# Patient Record
Sex: MALE | Race: WHITE | Employment: OTHER | URBAN - METROPOLITAN AREA
[De-identification: names, ages, dates, MRNs, and addresses within clinical notes are randomized per-mention and may not be internally consistent; named-entity substitution may affect disease eponyms.]

---

## 2017-02-24 ENCOUNTER — OFFICE VISIT (OUTPATIENT)
Dept: ORTHOPEDIC SURGERY | Age: 72
End: 2017-02-24

## 2017-02-24 VITALS
SYSTOLIC BLOOD PRESSURE: 181 MMHG | WEIGHT: 164.9 LBS | DIASTOLIC BLOOD PRESSURE: 102 MMHG | HEIGHT: 73 IN | HEART RATE: 84 BPM | BODY MASS INDEX: 21.86 KG/M2

## 2017-02-24 DIAGNOSIS — M25.561 RIGHT KNEE PAIN, UNSPECIFIED CHRONICITY: Primary | ICD-10-CM

## 2017-02-24 DIAGNOSIS — M23.303 MENISCUS, MEDIAL, DERANGEMENT, RIGHT: ICD-10-CM

## 2017-02-24 PROCEDURE — G8419 CALC BMI OUT NRM PARAM NOF/U: HCPCS | Performed by: ORTHOPAEDIC SURGERY

## 2017-02-24 PROCEDURE — 4040F PNEUMOC VAC/ADMIN/RCVD: CPT | Performed by: ORTHOPAEDIC SURGERY

## 2017-02-24 PROCEDURE — 1123F ACP DISCUSS/DSCN MKR DOCD: CPT | Performed by: ORTHOPAEDIC SURGERY

## 2017-02-24 PROCEDURE — 73562 X-RAY EXAM OF KNEE 3: CPT | Performed by: ORTHOPAEDIC SURGERY

## 2017-02-24 PROCEDURE — 20610 DRAIN/INJ JOINT/BURSA W/O US: CPT | Performed by: ORTHOPAEDIC SURGERY

## 2017-02-24 PROCEDURE — 99213 OFFICE O/P EST LOW 20 MIN: CPT | Performed by: ORTHOPAEDIC SURGERY

## 2017-02-24 PROCEDURE — G8427 DOCREV CUR MEDS BY ELIG CLIN: HCPCS | Performed by: ORTHOPAEDIC SURGERY

## 2017-02-24 PROCEDURE — 1036F TOBACCO NON-USER: CPT | Performed by: ORTHOPAEDIC SURGERY

## 2017-02-24 PROCEDURE — G8484 FLU IMMUNIZE NO ADMIN: HCPCS | Performed by: ORTHOPAEDIC SURGERY

## 2017-02-24 PROCEDURE — 3017F COLORECTAL CA SCREEN DOC REV: CPT | Performed by: ORTHOPAEDIC SURGERY

## 2017-02-24 RX ORDER — LISINOPRIL AND HYDROCHLOROTHIAZIDE 20; 12.5 MG/1; MG/1
2 TABLET ORAL DAILY
COMMUNITY
Start: 2016-12-19

## 2017-03-13 ENCOUNTER — TELEPHONE (OUTPATIENT)
Dept: ORTHOPEDIC SURGERY | Age: 72
End: 2017-03-13

## 2017-03-28 ENCOUNTER — HOSPITAL ENCOUNTER (OUTPATIENT)
Dept: SURGERY | Age: 72
Discharge: HOME | End: 2017-03-28
Attending: ORTHOPAEDIC SURGERY | Admitting: ORTHOPAEDIC SURGERY

## 2017-03-28 VITALS
RESPIRATION RATE: 15 BRPM | DIASTOLIC BLOOD PRESSURE: 78 MMHG | OXYGEN SATURATION: 99 % | WEIGHT: 170.4 LBS | TEMPERATURE: 98 F | HEART RATE: 78 BPM | HEIGHT: 74 IN | SYSTOLIC BLOOD PRESSURE: 147 MMHG | BODY MASS INDEX: 21.87 KG/M2

## 2017-03-28 LAB
HCT VFR BLD CALC: 41.9 % (ref 40.5–52.5)
HEMOGLOBIN: 14.2 G/DL (ref 13.5–17.5)
MCH RBC QN AUTO: 30.7 PG (ref 26–34)
MCHC RBC AUTO-ENTMCNC: 34 G/DL (ref 31–36)
MCV RBC AUTO: 90.4 FL (ref 80–100)
PDW BLD-RTO: 12.8 % (ref 12.4–15.4)
PLATELET # BLD: 155 K/UL (ref 135–450)
PMV BLD AUTO: 9.1 FL (ref 5–10.5)
RBC # BLD: 4.64 M/UL (ref 4.2–5.9)
REASON FOR REJECTION: NORMAL
REJECTED TEST: NORMAL
WBC # BLD: 4.7 K/UL (ref 4–11)

## 2017-03-28 RX ORDER — HYDROMORPHONE HCL 110MG/55ML
0.25 PATIENT CONTROLLED ANALGESIA SYRINGE INTRAVENOUS
Status: DISCONTINUED | OUTPATIENT
Start: 2017-03-28 | End: 2017-03-29 | Stop reason: HOSPADM

## 2017-03-28 RX ORDER — CEFAZOLIN SODIUM 2 G/100ML
2 INJECTION, SOLUTION INTRAVENOUS
Status: COMPLETED | OUTPATIENT
Start: 2017-03-28 | End: 2017-03-28

## 2017-03-28 RX ORDER — SODIUM CHLORIDE 9 MG/ML
INJECTION, SOLUTION INTRAVENOUS CONTINUOUS
Status: DISCONTINUED | OUTPATIENT
Start: 2017-03-28 | End: 2017-03-29 | Stop reason: HOSPADM

## 2017-03-28 RX ORDER — METOCLOPRAMIDE HYDROCHLORIDE 5 MG/ML
10 INJECTION INTRAMUSCULAR; INTRAVENOUS
Status: ACTIVE | OUTPATIENT
Start: 2017-03-28 | End: 2017-03-28

## 2017-03-28 RX ORDER — LIDOCAINE HYDROCHLORIDE 10 MG/ML
0.5 INJECTION, SOLUTION EPIDURAL; INFILTRATION; INTRACAUDAL; PERINEURAL ONCE
Status: DISCONTINUED | OUTPATIENT
Start: 2017-03-28 | End: 2017-03-29 | Stop reason: HOSPADM

## 2017-03-28 RX ORDER — FENTANYL CITRATE 50 UG/ML
25 INJECTION, SOLUTION INTRAMUSCULAR; INTRAVENOUS EVERY 5 MIN PRN
Status: DISCONTINUED | OUTPATIENT
Start: 2017-03-28 | End: 2017-03-29 | Stop reason: HOSPADM

## 2017-03-28 RX ORDER — ENALAPRILAT 2.5 MG/2ML
1.25 INJECTION INTRAVENOUS
Status: ACTIVE | OUTPATIENT
Start: 2017-03-28 | End: 2017-03-28

## 2017-03-28 RX ORDER — HYDROMORPHONE HCL 110MG/55ML
0.5 PATIENT CONTROLLED ANALGESIA SYRINGE INTRAVENOUS
Status: DISCONTINUED | OUTPATIENT
Start: 2017-03-28 | End: 2017-03-29 | Stop reason: HOSPADM

## 2017-03-28 RX ORDER — ASPIRIN 325 MG
325 TABLET, DELAYED RELEASE (ENTERIC COATED) ORAL DAILY
Qty: 28 TABLET | Refills: 0 | COMMUNITY
Start: 2017-03-28 | End: 2017-05-31

## 2017-03-28 RX ORDER — MEPERIDINE HYDROCHLORIDE 25 MG/ML
12.5 INJECTION INTRAMUSCULAR; INTRAVENOUS; SUBCUTANEOUS EVERY 5 MIN PRN
Status: DISCONTINUED | OUTPATIENT
Start: 2017-03-28 | End: 2017-03-29 | Stop reason: HOSPADM

## 2017-03-28 RX ORDER — PROMETHAZINE HYDROCHLORIDE 25 MG/ML
6.25 INJECTION, SOLUTION INTRAMUSCULAR; INTRAVENOUS
Status: ACTIVE | OUTPATIENT
Start: 2017-03-28 | End: 2017-03-28

## 2017-03-28 RX ORDER — HYDROCODONE BITARTRATE AND ACETAMINOPHEN 5; 325 MG/1; MG/1
1 TABLET ORAL
Status: COMPLETED | OUTPATIENT
Start: 2017-03-28 | End: 2017-03-28

## 2017-03-28 RX ORDER — FENTANYL CITRATE 50 UG/ML
50 INJECTION, SOLUTION INTRAMUSCULAR; INTRAVENOUS EVERY 5 MIN PRN
Status: DISCONTINUED | OUTPATIENT
Start: 2017-03-28 | End: 2017-03-29 | Stop reason: HOSPADM

## 2017-03-28 RX ADMIN — SODIUM CHLORIDE: 9 INJECTION, SOLUTION INTRAVENOUS at 12:32

## 2017-03-28 RX ADMIN — CEFAZOLIN SODIUM 2 G: 2 INJECTION, SOLUTION INTRAVENOUS at 13:31

## 2017-03-28 RX ADMIN — HYDROCODONE BITARTRATE AND ACETAMINOPHEN 1 TABLET: 5; 325 TABLET ORAL at 15:04

## 2017-03-28 ASSESSMENT — PAIN SCALES - GENERAL: PAINLEVEL_OUTOF10: 3

## 2017-03-28 ASSESSMENT — PAIN DESCRIPTION - DESCRIPTORS: DESCRIPTORS: ACHING

## 2017-03-28 ASSESSMENT — PAIN DESCRIPTION - PAIN TYPE: TYPE: SURGICAL PAIN

## 2017-03-28 ASSESSMENT — PAIN - FUNCTIONAL ASSESSMENT: PAIN_FUNCTIONAL_ASSESSMENT: 0-10

## 2017-03-29 ENCOUNTER — HOSPITAL ENCOUNTER (OUTPATIENT)
Dept: PHYSICAL THERAPY | Age: 72
Discharge: HOME | End: 2017-03-31
Admitting: ORTHOPAEDIC SURGERY

## 2017-03-30 ENCOUNTER — TELEPHONE (OUTPATIENT)
Dept: PHYSICAL THERAPY | Age: 72
End: 2017-03-30

## 2017-04-05 ENCOUNTER — HOSPITAL ENCOUNTER (OUTPATIENT)
Dept: PHYSICAL THERAPY | Age: 72
Discharge: HOME | End: 2017-04-05
Admitting: ORTHOPAEDIC SURGERY

## 2017-04-12 ENCOUNTER — HOSPITAL ENCOUNTER (OUTPATIENT)
Dept: PHYSICAL THERAPY | Age: 72
Discharge: HOME | End: 2017-04-12
Admitting: ORTHOPAEDIC SURGERY

## 2017-04-19 ENCOUNTER — HOSPITAL ENCOUNTER (OUTPATIENT)
Dept: PHYSICAL THERAPY | Age: 72
Discharge: HOME | End: 2017-04-19
Admitting: ORTHOPAEDIC SURGERY

## 2017-04-26 ENCOUNTER — HOSPITAL ENCOUNTER (OUTPATIENT)
Dept: PHYSICAL THERAPY | Age: 72
Discharge: HOME | End: 2017-04-26
Admitting: ORTHOPAEDIC SURGERY

## 2017-05-03 ENCOUNTER — HOSPITAL ENCOUNTER (OUTPATIENT)
Dept: PHYSICAL THERAPY | Age: 72
Discharge: HOME | End: 2017-05-03
Admitting: ORTHOPAEDIC SURGERY

## 2017-05-10 ENCOUNTER — HOSPITAL ENCOUNTER (OUTPATIENT)
Dept: PHYSICAL THERAPY | Age: 72
Discharge: HOME | End: 2017-05-10
Admitting: ORTHOPAEDIC SURGERY

## 2017-05-17 ENCOUNTER — HOSPITAL ENCOUNTER (OUTPATIENT)
Dept: PHYSICAL THERAPY | Age: 72
Discharge: HOME | End: 2017-05-17
Admitting: ORTHOPAEDIC SURGERY

## 2017-05-24 ENCOUNTER — HOSPITAL ENCOUNTER (OUTPATIENT)
Dept: PHYSICAL THERAPY | Age: 72
Discharge: HOME | End: 2017-05-24
Admitting: ORTHOPAEDIC SURGERY

## 2017-05-31 ENCOUNTER — HOSPITAL ENCOUNTER (OUTPATIENT)
Dept: PHYSICAL THERAPY | Age: 72
Discharge: HOME | End: 2017-05-31
Admitting: ORTHOPAEDIC SURGERY

## 2019-08-26 ENCOUNTER — OFFICE VISIT (OUTPATIENT)
Dept: ORTHOPEDIC SURGERY | Age: 74
End: 2019-08-26
Payer: MEDICARE

## 2019-08-26 VITALS
HEIGHT: 74 IN | DIASTOLIC BLOOD PRESSURE: 101 MMHG | WEIGHT: 162 LBS | BODY MASS INDEX: 20.79 KG/M2 | HEART RATE: 76 BPM | SYSTOLIC BLOOD PRESSURE: 190 MMHG

## 2019-08-26 DIAGNOSIS — M25.551 PAIN OF RIGHT HIP JOINT: ICD-10-CM

## 2019-08-26 DIAGNOSIS — M70.61 TROCHANTERIC BURSITIS OF RIGHT HIP: Primary | ICD-10-CM

## 2019-08-26 PROCEDURE — 99213 OFFICE O/P EST LOW 20 MIN: CPT | Performed by: ORTHOPAEDIC SURGERY

## 2019-08-26 PROCEDURE — 3017F COLORECTAL CA SCREEN DOC REV: CPT | Performed by: ORTHOPAEDIC SURGERY

## 2019-08-26 PROCEDURE — 4040F PNEUMOC VAC/ADMIN/RCVD: CPT | Performed by: ORTHOPAEDIC SURGERY

## 2019-08-26 PROCEDURE — G8427 DOCREV CUR MEDS BY ELIG CLIN: HCPCS | Performed by: ORTHOPAEDIC SURGERY

## 2019-08-26 PROCEDURE — G8420 CALC BMI NORM PARAMETERS: HCPCS | Performed by: ORTHOPAEDIC SURGERY

## 2019-08-26 PROCEDURE — 1036F TOBACCO NON-USER: CPT | Performed by: ORTHOPAEDIC SURGERY

## 2019-08-26 PROCEDURE — 1123F ACP DISCUSS/DSCN MKR DOCD: CPT | Performed by: ORTHOPAEDIC SURGERY

## 2019-08-26 NOTE — PROGRESS NOTES
and begin a physical therapy program.  He does have weakness to his right hip abductors compared to left and would benefit from therapy. We will have him follow-up in the next 4 to 6 weeks for recheck. DO Cecilia Beasley 484  Date:    8/26/2019    This dictation was performed with a verbal recognition program [facility] ) and it was checked for errors. It is possible that there are still dictated errors within this office note. If so, please bring any errors to my attention for an addendum. All efforts were made to ensure that this office note is accurate. Attestation:  I was physically present and performed my own examination of this patient and have discussed the case, including pertinent history and exam findings with the fellow. I agree with the documented assessment and plan. Eleno Castellano.  Clement Reeves MD

## 2019-09-03 ENCOUNTER — HOSPITAL ENCOUNTER (OUTPATIENT)
Dept: PHYSICAL THERAPY | Age: 74
Setting detail: THERAPIES SERIES
Discharge: HOME | End: 2019-09-03
Payer: MEDICARE

## 2019-09-03 PROCEDURE — 97140 MANUAL THERAPY 1/> REGIONS: CPT

## 2019-09-03 PROCEDURE — 97110 THERAPEUTIC EXERCISES: CPT

## 2019-09-03 PROCEDURE — 97161 PT EVAL LOW COMPLEX 20 MIN: CPT

## 2019-09-03 NOTE — PROGRESS NOTES
Physical Therapy  Initial Assessment  Date: 9/3/2019  Patient Name: Garrett Rome  MRN: [id number]  : 1945     Treatment Diagnosis: Lateral R hip pain, decreased posterior lateral hip strength, decreased IT band and gross LE flexibility, decreased hip extension ROM, decreased balance, antalgic gait    Outpatient fall risk assessment completed asking screening question if patient has fallen in the past 30 days:  [x] Yes  [] No    Based on screen for falls, patient demonstrates fall risk:  [] Yes  [x] No    Interventions based on fall risk status:  Updated Problem List within Medical History  [] Yes   [x] N/A    Asked family to assist with increased observation of the patient  [] Yes   [x] N/A    Patient kept in visible area when not closely supervised by therapist  [] Yes   [x] N/A    Repeatedly reinforce activity limits and safety needs with patient/family  [] Yes   [x] N/A    Increase frequency of rounding/monitoring patient  [] Yes   [x] N/A      Subjective   General  Chart Reviewed: Yes  Additional Pertinent Hx: PMHx: HTN, cancer  Family / Caregiver Present: No  Referring Practitioner: Alondra Abreu  Referral Date : 19  Diagnosis: R hip trochanteric busitis  PT Visit Information  Onset Date: 19  PT Insurance Information: Medicare  Total # of Visits Approved: 12  Total # of Visits to Date: 1  Subjective  Subjective: Pt reports about 2 months ago he noticed slight pain in his hip. It goes away when he starts walking after about 10-15 min. It was worsening so he went to the doctor. The x-rays were clear. They suspected bursitis. Returns to MD 10/14/19. Symptoms are located lateral R hip to mid thigh. Symptoms are described as sharp pain in the morning and soreness during the day; pain is intermittent. Pt denies N/T. Symptoms are exacerbated with walking (initially). Symptoms are eased with walking (after 10-15 min of walking). Pt's primary goal is to return to walking without pain.  PLOF:

## 2019-09-03 NOTE — FLOWSHEET NOTE
Gait Re-education- Provided training and instruction to the patient for proper LE, core and proximal hip recruitment and positioning and eccentric body weight control with ambulation re-education including up and down stairs     Home Management Training / Self Care:  [x] (15523) Provided self-care/home management training related to activities of daily living and compensatory training, and/or use of adaptive equipment for improvement with: ADLs and compensatory training, meal preparation, safety procedures and instruction in use of adaptive equipment, including bathing, grooming, dressing, personal hygiene, basic household cleaning and chores.      Home Exercise Program:    [x] (11133) Reviewed/Progressed HEP activities related to strengthening, flexibility, endurance, ROM of   [] LE / Lumbar: core, proximal hip and LE for functional self-care, mobility, lifting and ambulation/stair navigation   [] UE / Cervical: cervical, postural, scapular, scapulothoracic and UE control with self care, reaching, carrying, lifting, house/yardwork, driving, computer work  [] (86144)Reviewed/Progressed HEP activities related to improving balance, coordination, kinesthetic sense, posture, motor skill, proprioception of   [] LE: core, proximal hip and LE for self care, mobility, lifting, and ambulation/stair navigation    [] UE / Cervical: cervical, postural,  scapular, scapulothoracic and UE control with self care, reaching, carrying, lifting, house/yardwork, driving, computer work    Manual Treatments:  PROM / STM / Oscillations-Mobs:  G-I, II, III, IV (PA's, Inf., Post.)  [x] (07677) Provided manual therapy to mobilize LE, proximal hip and/or LS spine soft tissue/joints for the purpose of modulating pain, promoting relaxation,  increasing ROM, reducing/eliminating soft tissue swelling/inflammation/restriction, improving soft tissue extensibility and allowing for proper ROM for normal function with   [x] LE / lumbar: self care, mobility, lifting and ambulation. [] UE / Cervical: self care, reaching, carrying, lifting, house/yardwork, driving, computer work. Modalities:  [] (23026) Vasopneumatic compression: Utilized vasopneumatic compression to decrease edema / swelling for the purpose of improving mobility and quad tone / recruitment which will allow for increased overall function including but not limited to self-care, transfers, ambulation, and ascending / descending stairs. Modalities:   Consider CP    Charges:  Timed Code Treatment Minutes: 35   Total Treatment Minutes: 60     [x] EVAL - LOW (01990)   [] EVAL - MOD (37576)  [] EVAL - HIGH (77654)  [] RE-EVAL (56823)  [x] TE (82812) x 2     [] Ionto  [] NMR (52412) x      [] Vaso  [x] Manual (86260) x    1  [] Ultrasound  [] TA x       [] Mech Traction (52389)  [] Gait Training x     [] ES (un) (01460):   [] Aquatic therapy x   [] Other:   [] Group:     GOALS:     Progression Towards Functional goals:  [] Patient is progressing as expected towards functional goals listed. [] Progression is slowed due to complexities listed. [] Progression has been slowed due to co-morbidities. [x] Plan just implemented, too soon to assess goals progression  [] Other:     Persisting Functional Limitations/Impairments:  []Sleeping []Sitting               []Standing []Transfers        [x]Walking []Kneeling               []Stairs []Squatting / bending   []ADLs []Reaching  []Lifting  []Housework  []Driving []Job related tasks  []Sports/Recreation []Other:        ASSESSMENT:  See eval  Treatment/Activity Tolerance:  [x] Patient able to complete tx [] Patient limited by fatigue  [] Patient limited by pain  [] Patient limited by other medical complications  [] Other:     Prognosis: [x] Good [] Fair  [] Poor    Patient Requires Follow-up: [x] Yes  [] No    PLAN: See eval. PT 2x / week for 6 weeks.    [] Continue per plan of care [] Alter current plan (see comments)  [x] Plan of care initiated [] Hold pending MD visit [] Discharge    Electronically signed by: Dimitri Kwon PT, DPT    *Note: If patient does not return for scheduled / recommended follow up visit, this note will serve as their discharge note from physical therapy.

## 2019-09-05 ENCOUNTER — HOSPITAL ENCOUNTER (OUTPATIENT)
Dept: PHYSICAL THERAPY | Age: 74
Setting detail: THERAPIES SERIES
Discharge: HOME | End: 2019-09-05
Payer: MEDICARE

## 2019-09-05 PROCEDURE — 97110 THERAPEUTIC EXERCISES: CPT

## 2019-09-05 PROCEDURE — 97140 MANUAL THERAPY 1/> REGIONS: CPT

## 2019-09-05 NOTE — FLOWSHEET NOTE
Neuromuscular Re-ed (02579)       SLS with hip hike                                          Manual Intervention (35280) x 12       STM R IT band  X 7  min     Belt Mobs (R) hip Lateral with and without MWM ER Gr III 5'                                    Pt. Education:  -patient educated on diagnosis, prognosis and expectations for rehab  -all patient questions were answered    HEP instruction:  9/3 -patient provided with written and illustrated instructions for HEP (see scanned image in ): IT band stretch in long sitting, clamshell, hip abd in side lying    Therapeutic Exercise and NMR EXR  [x] (61258) Provided verbal/tactile cueing for activities related to strengthening, flexibility, endurance, ROM for improvements in  [x] LE / Lumbar: LE, proximal hip, and core control with self care, mobility, lifting, ambulation. [] UE / Cervical: cervical, postural, scapular, scapulothoracic and UE control with self care, reaching, carrying, lifting, house/yardwork, driving, computer work.  [] (48395) Provided verbal/tactile cueing for activities related to improving balance, coordination, kinesthetic sense, posture, motor skill, proprioception to assist with   [] LE / lumbar: LE, proximal hip, and core control in self care, mobility, lifting, ambulation and eccentric single leg control. [] UE / cervical: cervical, scapular, scapulothoracic and UE control with self care, reaching, carrying, lifting, house/yardwork, driving, computer work.   [] (91814) Therapist is in constant attendance of 2 or more patients providing skilled therapy interventions, but not providing any significant amount of measurable one-on-one time to either patient, for improvements in  [] LE / lumbar: LE, proximal hip, and core control in self care, mobility, lifting, ambulation and eccentric single leg control.    [] UE / cervical: cervical, scapular, scapulothoracic and UE control with self care, reaching,

## 2019-09-13 ENCOUNTER — HOSPITAL ENCOUNTER (OUTPATIENT)
Dept: PHYSICAL THERAPY | Age: 74
Setting detail: THERAPIES SERIES
Discharge: HOME | End: 2019-09-13
Payer: MEDICARE

## 2019-09-13 PROCEDURE — 97110 THERAPEUTIC EXERCISES: CPT | Performed by: PHYSICAL THERAPIST

## 2019-09-13 PROCEDURE — 97140 MANUAL THERAPY 1/> REGIONS: CPT | Performed by: PHYSICAL THERAPIST

## 2019-09-13 NOTE — FLOWSHEET NOTE
Limitations/Impairments:  []Sleeping []Sitting               []Standing []Transfers        [x]Walking []Kneeling               []Stairs []Squatting / bending   []ADLs []Reaching  []Lifting  []Housework  []Driving []Job related tasks  []Sports/Recreation []Other:        ASSESSMENT:      Treatment/Activity Tolerance:  [x] Patient able to complete tx  [] Patient limited by fatigue  [] Patient limited by pain  [] Patient limited by other medical complications  [x] Other: Pt. Tolerated therapy today with minimal complaints of soreness and fatigue. Due to prolonged soreness following previous therapy session limited in progression of strengthening activities this date. Pt. Required cueing to decrease compensations with hip abd and extension activities. Pt. Continues to present with proximal ITB tenderness. Pt. Will continue to benefit from skilled therapy in order to restore hip strength and mobility for decreased pain with walking. Prognosis: [x] Good [] Fair  [] Poor    Patient Requires Follow-up: [x] Yes  [] No    PLAN: See eval. PT 2x / week for 6 weeks. [x] Continue per plan of care [] Alter current plan (see comments)  [] Plan of care initiated [] Hold pending MD visit [] Discharge    Electronically signed by: Garrick Lance PT, DPT    *Note: If patient does not return for scheduled / recommended follow up visit, this note will serve as their discharge note from physical therapy.

## 2019-09-20 ENCOUNTER — HOSPITAL ENCOUNTER (OUTPATIENT)
Dept: PHYSICAL THERAPY | Age: 74
Setting detail: THERAPIES SERIES
Discharge: HOME | End: 2019-09-20
Payer: MEDICARE

## 2019-09-20 PROCEDURE — 97140 MANUAL THERAPY 1/> REGIONS: CPT

## 2019-09-20 PROCEDURE — 97110 THERAPEUTIC EXERCISES: CPT

## 2019-09-20 NOTE — FLOWSHEET NOTE
Re-ed (36107) x2'       SLS with hip hike  15\" 3 Added 9/13   SLS  20 sec 2                                Manual Intervention (65404) x 12'       IASTM R IT band with HG and manual STM  10'     Stretching/PROM Hip flex, add, ER 5'     LA distraction R 3'                              Pt. Education:  -all patient questions were answered  -reviewed soreness vs pain and expectations following  -pt. To continue walking program with pain as guide    HEP instruction:  -reviewed HEP    Therapeutic Exercise and NMR EXR  [x] (40551) Provided verbal/tactile cueing for activities related to strengthening, flexibility, endurance, ROM for improvements in  [x] LE / Lumbar: LE, proximal hip, and core control with self care, mobility, lifting, ambulation. [] UE / Cervical: cervical, postural, scapular, scapulothoracic and UE control with self care, reaching, carrying, lifting, house/yardwork, driving, computer work.  [] (85614) Provided verbal/tactile cueing for activities related to improving balance, coordination, kinesthetic sense, posture, motor skill, proprioception to assist with   [] LE / lumbar: LE, proximal hip, and core control in self care, mobility, lifting, ambulation and eccentric single leg control. [] UE / cervical: cervical, scapular, scapulothoracic and UE control with self care, reaching, carrying, lifting, house/yardwork, driving, computer work.   [] (26485) Therapist is in constant attendance of 2 or more patients providing skilled therapy interventions, but not providing any significant amount of measurable one-on-one time to either patient, for improvements in  [] LE / lumbar: LE, proximal hip, and core control in self care, mobility, lifting, ambulation and eccentric single leg control. [] UE / cervical: cervical, scapular, scapulothoracic and UE control with self care, reaching, carrying, lifting, house/yardwork, driving, computer work.      NMR and Therapeutic Activities:    [] (51439 or 81652)

## 2019-09-24 ENCOUNTER — HOSPITAL ENCOUNTER (OUTPATIENT)
Dept: PHYSICAL THERAPY | Age: 74
Setting detail: THERAPIES SERIES
Discharge: HOME | End: 2019-09-24
Payer: MEDICARE

## 2019-09-24 PROCEDURE — 97140 MANUAL THERAPY 1/> REGIONS: CPT

## 2019-09-24 PROCEDURE — 97110 THERAPEUTIC EXERCISES: CPT

## 2019-09-24 NOTE — FLOWSHEET NOTE
[facility] - Outpatient Physical Therapy    Physical Therapy Daily Treatment Note  Date:  2019    Patient Name:  Dhara Olmstead    :  1945  MRN: [id number]  Medical/Treatment Diagnosis Information:  Diagnosis: R hip trochanteric busitis  Treatment Diagnosis: Lateral R hip pain, decreased posterior lateral hip strength, decreased IT band and gross LE flexibility, decreased hip extension ROM, decreased balance, antalgic gait  Insurance/Certification information:  PT Insurance Information: Medicare  Physician Information:  Referring Practitioner: Hazel Abernathy  Plan of care signed (Y/N): N     Date of Patient follow up with Physician: 10/14/19    Functional scale[de-identified] LEFS 55/80    Progress Note: []  Yes  [x]  No  Next due by: Visit #10       Latex Allergy:  [x]NO      []YES  Preferred Language for Healthcare:   [x]English       []other:    Visit # Insurance Allowable Date Range (if applicable)    Med Nec      Pain level:  1-210 R lateral hip     SUBJECTIVE:  Pt reports he is feeling ok. He was sore after IASTM last visit. He walked 30 min yesterday. He has been icing after exercising.       OBJECTIVE:      25% loss of (R) hip ER, mild hypomobility noted,  Significant restrictions remain (R) TFL/IT Band,  moderate      RESTRICTIONS/PRECAUTIONS: PMHx of cancer and HTN    Exercises/Interventions:     Therapeutic Exercises (68502) x 25' Resistance / level Sets/sec Reps Notes   Bike L2 4'  Added    Prone Quad Stretch  30\" 3 Added    Supine Bridging Lime  ^ cueing for glut activation, neutral spine   Resisted hip abduction and hip ext  Added    Piriformis stretch - figure 4  30\" 3    IT band stretch in supine  Modified    Clamshell Lime HEP ^    Side lying hip abd  HEP cueing to keep leg back in neutral/slight extension, decrease hip ER compensation   Leg press 80# 3 10 Single leg   TB resisted side stepping [place] 3 20 ft B                  Therapeutic

## 2019-09-27 ENCOUNTER — HOSPITAL ENCOUNTER (OUTPATIENT)
Dept: PHYSICAL THERAPY | Age: 74
Setting detail: THERAPIES SERIES
Discharge: HOME | End: 2019-09-27
Payer: MEDICARE

## 2019-09-27 PROCEDURE — 97110 THERAPEUTIC EXERCISES: CPT

## 2019-09-27 NOTE — FLOWSHEET NOTE
(PA's, Inf., Post.)  [x] (84859) Provided manual therapy to mobilize LE, proximal hip and/or LS spine soft tissue/joints for the purpose of modulating pain, promoting relaxation,  increasing ROM, reducing/eliminating soft tissue swelling/inflammation/restriction, improving soft tissue extensibility and allowing for proper ROM for normal function with   [x] LE / lumbar: self care, mobility, lifting and ambulation. [] UE / Cervical: self care, reaching, carrying, lifting, house/yardwork, driving, computer work. Modalities:  [] (29784) Vasopneumatic compression: Utilized vasopneumatic compression to decrease edema / swelling for the purpose of improving mobility and quad tone / recruitment which will allow for increased overall function including but not limited to self-care, transfers, ambulation, and ascending / descending stairs. Modalities:   deferred    Charges:  Timed Code Treatment Minutes: 30   Total Treatment Minutes: 30     [] EVAL - LOW (69306)   [] EVAL - MOD (09584)  [] EVAL - HIGH (76548)  [] RE-EVAL (13009)  [x] TE (55907) x 2   [] Ionto  [] NMR (04875) x      [] Vaso  [] Manual (28870) x    [] Ultrasound  [] TA x       [] Mech Traction (22103)  [] Gait Training x     [] ES (un) (83772):   [] Aquatic therapy x   [] Other:   [] Group:     GOALS:     Progression Towards Functional goals:  [] Patient is progressing as expected towards functional goals listed. [] Progression is slowed due to complexities listed. [] Progression has been slowed due to co-morbidities.   [x] Plan just implemented, too soon to assess goals progression  [] Other:     Persisting Functional Limitations/Impairments:  []Sleeping []Sitting               []Standing []Transfers        [x]Walking []Kneeling               []Stairs []Squatting / bending   []ADLs []Reaching  []Lifting  []Housework  []Driving []Job related tasks  []Sports/Recreation []Other:        ASSESSMENT:      Treatment/Activity Tolerance:  [x] Patient

## 2019-10-01 ENCOUNTER — HOSPITAL ENCOUNTER (OUTPATIENT)
Dept: PHYSICAL THERAPY | Age: 74
Setting detail: THERAPIES SERIES
Discharge: HOME | End: 2019-10-01
Payer: MEDICARE

## 2019-10-01 PROCEDURE — 97110 THERAPEUTIC EXERCISES: CPT

## 2019-10-04 ENCOUNTER — APPOINTMENT (OUTPATIENT)
Dept: PHYSICAL THERAPY | Age: 74
End: 2019-10-04
Payer: MEDICARE

## 2019-10-10 ENCOUNTER — HOSPITAL ENCOUNTER (OUTPATIENT)
Dept: PHYSICAL THERAPY | Age: 74
Setting detail: THERAPIES SERIES
Discharge: HOME | End: 2019-10-10
Payer: MEDICARE

## 2019-10-10 PROCEDURE — 97110 THERAPEUTIC EXERCISES: CPT

## 2019-10-14 ENCOUNTER — OFFICE VISIT (OUTPATIENT)
Dept: ORTHOPEDIC SURGERY | Age: 74
End: 2019-10-14
Payer: MEDICARE

## 2019-10-14 VITALS
HEIGHT: 74 IN | WEIGHT: 160 LBS | DIASTOLIC BLOOD PRESSURE: 75 MMHG | SYSTOLIC BLOOD PRESSURE: 138 MMHG | BODY MASS INDEX: 20.53 KG/M2 | HEART RATE: 62 BPM

## 2019-10-14 DIAGNOSIS — M70.61 TROCHANTERIC BURSITIS OF RIGHT HIP: Primary | ICD-10-CM

## 2019-10-14 PROCEDURE — 4040F PNEUMOC VAC/ADMIN/RCVD: CPT | Performed by: ORTHOPAEDIC SURGERY

## 2019-10-14 PROCEDURE — 99212 OFFICE O/P EST SF 10 MIN: CPT | Performed by: ORTHOPAEDIC SURGERY

## 2019-10-14 PROCEDURE — 3017F COLORECTAL CA SCREEN DOC REV: CPT | Performed by: ORTHOPAEDIC SURGERY

## 2019-10-14 PROCEDURE — G8420 CALC BMI NORM PARAMETERS: HCPCS | Performed by: ORTHOPAEDIC SURGERY

## 2019-10-14 PROCEDURE — 1123F ACP DISCUSS/DSCN MKR DOCD: CPT | Performed by: ORTHOPAEDIC SURGERY

## 2019-10-14 PROCEDURE — 1036F TOBACCO NON-USER: CPT | Performed by: ORTHOPAEDIC SURGERY

## 2019-10-14 PROCEDURE — G8484 FLU IMMUNIZE NO ADMIN: HCPCS | Performed by: ORTHOPAEDIC SURGERY

## 2019-10-14 PROCEDURE — G8427 DOCREV CUR MEDS BY ELIG CLIN: HCPCS | Performed by: ORTHOPAEDIC SURGERY

## 2020-07-08 ENCOUNTER — OFFICE VISIT (OUTPATIENT)
Dept: ENT CLINIC | Age: 75
End: 2020-07-08
Payer: MEDICARE

## 2020-07-08 VITALS
BODY MASS INDEX: 21.17 KG/M2 | HEART RATE: 112 BPM | SYSTOLIC BLOOD PRESSURE: 147 MMHG | DIASTOLIC BLOOD PRESSURE: 92 MMHG | WEIGHT: 165 LBS | TEMPERATURE: 97.8 F | HEIGHT: 74 IN

## 2020-07-08 PROCEDURE — 99203 OFFICE O/P NEW LOW 30 MIN: CPT | Performed by: OTOLARYNGOLOGY

## 2020-07-08 PROCEDURE — 69210 REMOVE IMPACTED EAR WAX UNI: CPT | Performed by: OTOLARYNGOLOGY

## 2020-07-08 RX ORDER — OFLOXACIN 3 MG/ML
4 SOLUTION/ DROPS OPHTHALMIC DAILY
Qty: 1 BOTTLE | Refills: 0 | Status: SHIPPED | OUTPATIENT
Start: 2020-07-08 | End: 2020-07-18

## 2020-07-08 RX ORDER — ACETIC ACID 20.65 MG/ML
4 SOLUTION AURICULAR (OTIC) DAILY
Qty: 1 BOTTLE | Refills: 0 | Status: SHIPPED | OUTPATIENT
Start: 2020-07-08 | End: 2020-07-15

## 2020-07-08 NOTE — PROGRESS NOTES
[facility]      Patient Name: [address] Record Number:  [id number]  Primary Care Physician:  Aftab Brantley MD  Date of Consultation: 7/8/2020    Chief Complaint: Clogged sensation left ear        HISTORY OF PRESENT ILLNESS  Jonny Malhotra is a(n) 75 y.o. male who presents for evaluation of a clogged sensation and mild discomfort of left ear for several days now. Patient says that he feels that his left ear is somewhat clogged up. He said that the hearing might be a little decreased in the left side compared to the right as well. He said that prior to this his hearing was completely normal.  He does not have any history of ear surgery. He said that he has had an outbreak on the outside of his ear a few times that seem to respond to an antifungal cream.  But this has not been a problem in a year or 2. He has no other new complaints today. Patient Active Problem List   Diagnosis    Chronic external ear infection     Past Surgical History:   Procedure Laterality Date    COLONOSCOPY      KNEE ARTHROSCOPY Bilateral     KNEE SURGERY Right 03/28/2017    RIGHT KNEE ARTHROSCOPE PARTIAL MEDIAL MENISECTOMY WITH MICRO FRACTURE OF MEDIAL FEMORAL CONDYLE    TOTAL NEPHRECTOMY Left     CANCER    TURP      X 4     No family history on file.   Social History     Socioeconomic History    Marital status:      Spouse name: Not on file    Number of children: Not on file    Years of education: Not on file    Highest education level: Not on file   Occupational History    Not on file   Social Needs    Financial resource strain: Not on file    Food insecurity     Worry: Not on file     Inability: Not on file    Transportation needs     Medical: Not on file     Non-medical: Not on file   Tobacco Use    Smoking status: Former Smoker     Packs/day: 1.00     Years: 5.00     Pack years: 5.00    Smokeless tobacco: Never Used   Substance and Sexual Activity    Alcohol use: Yes     Alcohol/week: 0.0 standard drinks     Comment: 2 DRINKS A WEEK    Drug use: No    Sexual activity: Not on file   Lifestyle    Physical activity     Days per week: Not on file     Minutes per session: Not on file    Stress: Not on file   Relationships    Social connections     Talks on phone: Not on file     Gets together: Not on file     Attends Taoism service: Not on file     Active member of club or organization: Not on file     Attends meetings of clubs or organizations: Not on file     Relationship status: Not on file    Intimate partner violence     Fear of current or ex partner: Not on file     Emotionally abused: Not on file     Physically abused: Not on file     Forced sexual activity: Not on file   Other Topics Concern    Not on file   Social History Narrative    Not on file       DRUG/FOOD ALLERGIES: Terbinafine    CURRENT MEDICATIONS  Prior to Admission medications    Medication Sig Start Date End Date Taking?  Authorizing Provider   acetic acid (VOSOL) 2 % otic solution Place 4 drops into the left ear daily for 7 days 7/8/20 7/15/20 Yes Estela Mack MD   ofloxacin (OCUFLOX) 0.3 % solution Place 4 drops in ear(s) daily for 10 days 7/8/20 7/18/20 Yes Estela Mack MD   aspirin 81 MG tablet Take 81 mg by mouth daily    Historical Provider, MD   Multiple Vitamins-Minerals (THERAPEUTIC MULTIVITAMIN-MINERALS) tablet Take 1 tablet by mouth daily    Historical Provider, MD   lisinopril-hydrochlorothiazide (PRINZIDE;ZESTORETIC) 20-12.5 MG per tablet Take 2 tablets by mouth daily  12/19/16   Historical Provider, MD       REVIEW OF SYSTEMS  The following systems were reviewed and revealed the following in addition to any already discussed in the HPI:    CONSTITUTIONAL: no weight loss, no fever, no night sweats, no chills  EYES: no vision changes, no blurry vision  EARS: Discomfort and decreased hearing left side  NOSE: no epistaxis, no rhinorrhea  RESPIRATORY: no  Difficulty breathing, no shortness of breath  CV: no chest pain, no Peripheral vascular disease  HEME: No coagulation disorder, no Bleeding disorder  NEURO: no TIA or stroke-like symptoms  SKIN: No new rashes in the head and neck, no recent skin cancers  MOUTH: No new ulcers, no recent teeth infections  GASTROINTESTINAL: No diarrhea, stomach pain  PSYCH: No anxiety, no depression      PHYSICAL EXAM  BP (!) 147/92 (Site: Left Upper Arm, Position: Sitting, Cuff Size: Medium Adult)   Pulse 112   Temp 97.8 °F (36.6 °C) (Temporal)   Ht 6' 2\" (1.88 m)   Wt 165 lb (74.8 kg)   BMI 21.18 kg/m²     GENERAL: No Acute Distress, Alert and Oriented, no Hoarseness, strong voice  EYES: EOMI, Anti-icteric  HENT:   Head: Normocephalic and atraumatic. Face:  Symmetric, facial nerve intact, no sinus tenderness   ears: See below  Mouth/Oral Cavity:  normal lips, Uvula is midline, no mucosal lesions, no trismus, normal dentition, normal salivary quality/flow  Oropharynx/Larynx:  normal oropharynx, normal tonsils; normal larynx/nasopharynx on mirror exam  Nose:Normal external nasal appearance. Anterior rhinoscopy shows a normal septum. normal turbinates. Normal mucosa   NECK: Normal range of motion, no thyromegaly, trachea is midline, no lymphadenopathy, no neck masses, no crepitus  CHEST: Normal respiratory effort, no retractions, breathing comfortably  SKIN: No rashes, normal appearing skin, no evidence of skin lesions/tumors  Neuro:  cranial nerve II-XII intact; normal gait  Cardio:  no edema      PROCEDURE  Bilateral ear exam with binocular scope with debridement  The right ear was visualized with binocular scope. Tympanic membrane intact and aerated middle ear    The left side external ear the patient has a prominent Kg tubercle without any evidence of cellulitic changes or infections of the external ear.   No mastoid tenderness patient had a lot of wet thick cerumen and debris in the ear canal itself that I evacuated. Call to tell if there was a middle ear effusion    Gates was midline        ASSESSMENT/PLAN  1. Acute swimmer's ear of left side  This patient appears to have an otitis externa is relatively mild. Given his history of external ear skin infections that respond to antifungals, I am not sure if this is a fungus or bacteria. I would like for him to alternate acetic acid drops and Floxin drops for the next week. I like to see him in a couple weeks to make sure he healed up. I do not think he has a middle ear effusion as his Gates test was midline. If he still having subjective hearing loss we will get a hearing test at some point. I have performed a head and neck physical exam personally or was physically present during the key or critical portions of the service. Medical Decision Making:   The following items were considered in medical decision making:  Independent review of images  Review / order clinical lab tests  Review / order radiology tests  Decision to obtain old records

## 2020-07-22 ENCOUNTER — OFFICE VISIT (OUTPATIENT)
Dept: ENT CLINIC | Age: 75
End: 2020-07-22
Payer: MEDICARE

## 2020-07-22 VITALS
WEIGHT: 166 LBS | HEART RATE: 77 BPM | SYSTOLIC BLOOD PRESSURE: 175 MMHG | DIASTOLIC BLOOD PRESSURE: 81 MMHG | BODY MASS INDEX: 21.3 KG/M2 | TEMPERATURE: 96.8 F | HEIGHT: 74 IN

## 2020-07-22 PROCEDURE — 99213 OFFICE O/P EST LOW 20 MIN: CPT | Performed by: OTOLARYNGOLOGY

## 2020-07-22 NOTE — PROGRESS NOTES
[facility]      Patient Name: [address] Record Number:  [id number]  Primary Care Physician:  Geno Arroyo MD  Date of Consultation: 7/22/2020          BRIEF HISTORY OF PRESENT ILLNESS  Jessica Beasley is a(n) 75 y.o. male who presents for follow-up of his left otitis externa. I saw him on July 8. He had had a history of skin infections responding to antifungal so I alternated acetic acid with Floxin drops. he said this made a significant difference. He did bring up the fact that he has intermittent tinnitus that is high-frequency and not pulsatile. He thinks that his hearing is not too bad. He does not have any other significant ear history. Patient Active Problem List   Diagnosis    Chronic external ear infection     Past Surgical History:   Procedure Laterality Date    COLONOSCOPY      KNEE ARTHROSCOPY Bilateral     KNEE SURGERY Right 03/28/2017    RIGHT KNEE ARTHROSCOPE PARTIAL MEDIAL MENISECTOMY WITH MICRO FRACTURE OF MEDIAL FEMORAL CONDYLE    TOTAL NEPHRECTOMY Left     CANCER    TURP      X 4     No family history on file. Social History     Socioeconomic History    Marital status:      Spouse name: Not on file    Number of children: Not on file    Years of education: Not on file    Highest education level: Not on file   Occupational History    Not on file   Social Needs    Financial resource strain: Not on file    Food insecurity     Worry: Not on file     Inability: Not on file    Transportation needs     Medical: Not on file     Non-medical: Not on file   Tobacco Use    Smoking status: Former Smoker     Packs/day: 1.00     Years: 5.00     Pack years: 5.00    Smokeless tobacco: Never Used   Substance and Sexual Activity    Alcohol use:  Yes     Alcohol/week: 0.0 standard drinks     Comment: 2 DRINKS A WEEK    Drug use: No    Sexual activity: Not on file   Lifestyle    Physical activity     Days per Medium Adult)   Pulse 77   Temp 96.8 °F (36 °C) (Temporal)   Ht 6' 2\" (1.88 m)   Wt 166 lb (75.3 kg)   BMI 21.31 kg/m²     GENERAL: No Acute Distress, Alert and Oriented, no Hoarseness, strong voice  HENT:   Head: Normocephalic and atraumatic. Face:  Symmetric, facial nerve intact, no sinus tenderness   ears: See below  Mouth/Oral Cavity:  normal lips, Uvula is midline, no mucosal lesions, no trismus, normal dentition, normal salivary quality/flow  Oropharynx/Larynx:  normal oropharynx, normal tonsils; normal larynx/nasopharynx on mirror exam  Nose:Normal external nasal appearance. Anterior rhinoscopy shows a normal septum. normal turbinates. Normal mucosa   NECK: Normal range of motion, no thyromegaly, trachea is midline, no lymphadenopathy, no neck masses, no crepitus  CHEST: Normal respiratory effort, no retractions, breathing comfortably  SKIN: No rashes, normal appearing skin, no evidence of skin lesions/tumors  Neuro:  cranial nerve II-XII intact; normal gait  Cardio:  no edema      PROCEDURE  Bilateral ear exam  Binocular scope was used to visualize the right ear. Tympanic membrane intact with an aerated middle ear    Left ear was then visualized. There was a little bit of dampness in the external auditory canal that evacuated with a Gonzalez suction. Otherwise there is a significant improvement in the otitis externa. Tympanic membrane intact        ASSESSMENT/PLAN  1. Acute swimmer's ear of left side  This appears to have resolved. He should follow-up with me if he feels as though is recurring. 2. Bilateral hearing loss, unspecified hearing loss type  The patient has likely bilateral hearing loss given the tinnitus and age. I recommended a hearing test.  He will schedule the hearing test and see me afterwards. 3. Tinnitus of both ears  Likely secondary to hearing loss.              I have performed a head and neck physical exam personally or was physically present during the key or critical portions of the service. Medical Decision Making:   The following items were considered in medical decision making:  Independent review of images  Review / order clinical lab tests  Review / order radiology tests  Decision to obtain old records

## 2020-07-24 NOTE — PROGRESS NOTES
Juan Manuel Prince   1945, 75 y.o. male   [id number]       Referring Provider: Candelario Ocasio MD  Referral Type: In an order in [address] [place] [address]    Reason for Visit: Evaluation of suspected change in hearing, tinnitus, or balance. ADULT AUDIOLOGIC EVALUATION      Juan Manuel Prince is a 75 y.o. male seen today, 7/30/2020 , for an initial audiologic evaluation. Patient was seen by Candelario Ocasio MD following today's evaluation. AUDIOLOGIC AND OTHER PERTINENT MEDICAL HISTORY:      Juan Manuel Prince noted tinnitus, history of recreational noise exposure and family history of hearing loss. Patient reports long standing bilateral tinnitus that is constant in nature. He notes a history of shooting with the use of HPDs. His mother had a hearing loss that was \"age-related\". Juan Manuel Prince denied otalgia, aural fullness, otorrhea, dizziness, imbalance, history of falls, history of head trauma and history of ear surgery. Date: 7/30/2020     IMPRESSIONS:        AD: Hearing WNL sloping to moderate SNHL, Excellent WRS, Type A tymp  AS: Hearing WNL sloping to mild SNHL, Excellent WRS, Type A tymp    Hearing loss consistent with  Sensorineural hearing loss. Discussed hearing loss and tinnitus with patient. Patient to follow medical recommendations per Candelario Ocasio MD .    ASSESSMENT AND FINDINGS:     Otoscopy revealed: Clear ear canals bilaterally    RIGHT EAR:  Hearing Sensitivity: Normal hearing sensitivity to Moderate sensorineural hearing loss  Speech Recognition Threshold: 25 dB HL  Word Recognition: Excellent (%), based on NU-6 25-word list at 60 dBHL using recorded speech stimuli. Tympanometry: Normal peak pressure and compliance, Type A tympanogram, consistent with normal middle ear function. Acoustic Reflexes: Ipsilateral: Did not test. Contralateral: Did not test.    LEFT EAR:  Hearing Sensitivity: Normal hearing sensitivity to Mild sensorineural hearing loss.   Speech Recognition Threshold: 20 dB HL  Word Severe 70 - 90   Profound 90 +

## 2020-07-30 ENCOUNTER — PROCEDURE VISIT (OUTPATIENT)
Dept: AUDIOLOGY | Age: 75
End: 2020-07-30
Payer: MEDICARE

## 2020-07-30 ENCOUNTER — OFFICE VISIT (OUTPATIENT)
Dept: ENT CLINIC | Age: 75
End: 2020-07-30
Payer: MEDICARE

## 2020-07-30 VITALS
BODY MASS INDEX: 21.69 KG/M2 | TEMPERATURE: 97.5 F | DIASTOLIC BLOOD PRESSURE: 91 MMHG | HEIGHT: 74 IN | SYSTOLIC BLOOD PRESSURE: 171 MMHG | HEART RATE: 96 BPM | WEIGHT: 169 LBS

## 2020-07-30 PROCEDURE — 92567 TYMPANOMETRY: CPT | Performed by: AUDIOLOGIST

## 2020-07-30 PROCEDURE — 99213 OFFICE O/P EST LOW 20 MIN: CPT | Performed by: OTOLARYNGOLOGY

## 2020-07-30 PROCEDURE — 92557 COMPREHENSIVE HEARING TEST: CPT | Performed by: AUDIOLOGIST

## 2020-07-30 NOTE — Clinical Note
Dr. Donovan Hollins,    Please see note from this patient's same-day audiogram from today. Please let me know if there is anything further you need.         Dave Kramer  Audiologist

## 2020-07-30 NOTE — PATIENT INSTRUCTIONS
Good Communication Strategies    Communication can be challenging for anyone, but can be especially difficult for those with some degree of hearing loss. While we may not be able to control every factor that may lead to difficulty with communication, there are Good Communication Strategies that we can all use in our day-to-day lives. Communication takes both parties working together for it to be successful. Tips as a Listener:   1. Control your environment. It is important to limit the amount of background noise in the room when possible. You should also consider having a good light source in the room to best see the other person. 2. Ask for clarification. Instead of saying \"What?\", you can use parts of what you heard to make a new question. For example, if you heard the word \"Thursday\" but not the rest of the week, you may ask \"What was that about Thursday? \" or \"What did you want to do Thursday? \". This shows the person talking that you are listening and will help them better explain what they are saying. 3. Be an advocate for yourself. If you are hearing but not understanding, tell the other person \"I can hear you, but I need you to slow down when you speak. \"  Or if someone is facing the other direction, say \"I cannot hear you when you are not looking at me when we talk. \"       Tips as a Talker:   - Sit or stand 3 to 6 feet away to maximize audibility         -- It is unrealistic to believe someone else will fully hear your message if you are speaking from across the room or in a different room in the house   - Stay at eye level to help with visual cues   - Make sure you have the persons attention before speaking   - Use facial expressions and gestures to accentuate your message   - Raise your voice slightly (do not scream)   - Speak slowly and distinctly   - Use short, simple sentences   - Rephrase your words if the person is having a hard time understanding you    - To avoid distortion, dont speak directly into a persons ear      Some additional items that may be helpful:   - Remain patient - this is important for both parties   - Write down items that still cannot be heard/understood. You may write with pen/paper or consider typing/texting on a cell phone or smart device. - If background noise is unavoidable, try to keep yourself in a good position in the room. By sitting at a fuentes on the side of the restaurant (preferably a corner), it will be easier to communicate than if you were sitting at a table in the middle with background noise surrounding you. Keep yourself positioned away from music speakers or heavy foot traffic. Hearing Loss: Care Instructions  Your Care Instructions      Hearing loss is a sudden or slow decrease in how well you hear. It can range from mild to profound. Permanent hearing loss can occur with aging, and it can happen when you are exposed long-term to loud noise. Examples include listening to loud music, riding motorcycles, or being around other loud machines. Hearing loss can affect your work and home life. It can make you feel lonely or depressed. You may feel that you have lost your independence. But hearing aids and other devices can help you hear better and feel connected to others. Follow-up care is a key part of your treatment and safety. Be sure to make and go to all appointments, and call your doctor if you are having problems. It's also a good idea to know your test results and keep a list of the medicines you take. How can you care for yourself at home? · Avoid loud noises whenever possible. This helps keep your hearing from getting worse. Always wear hearing protection around loud noises. · If appropriate, wear hearing aid(s) as directed. It is recommended that hearing aids are worn during all waking hours to keep your brain active and give it access to the sounds it is missing.       · If you are beginning your process with hearing aid(s), schedule a \"Hearing Aid Evaluation\" with an audiologist to discuss your lifestyle, features of hearing aid technology, and styles of hearing aids available. It is recommended that you contact your insurance company to determine if you have a hearing aid benefit, as this may dictate who you can see for these services. · Have hearing tests as your doctor suggests. They can show whether your hearing has changed. Your hearing aid may need to be adjusted. · Use other assistive devices as needed. These may include:  ? Telephone amplifiers and hearing aids that can connect to a television, stereo, radio, or microphone. ? Devices that use lights or vibrations. These alert you to the doorbell, a ringing telephone, or a baby monitor. ? Television closed-captioning. This shows the words at the bottom of the screen. Most new TVs can do this. ? TTY (text telephone). This lets you type messages back and forth on the telephone instead of talking or listening. These devices are also called TDD. When messages are typed on the keyboard, they are sent over the phone line to a receiving TTY. The message is shown on a monitor. · Use pagers, fax machines, text, and email if it is hard for you to communicate by telephone. · Try to learn a listening technique called speech-reading. It is not lip-reading. You pay attention to people's gestures, expressions, posture, and tone of voice. These clues can help you understand what a person is saying. Face the person you are talking to, and have him or her face you. Make sure the lighting is good. You need to see the other person's face clearly. · Think about counseling if you need help to adjust to your hearing loss. When should you call for help? Watch closely for changes in your health, and be sure to contact your doctor if:    · You think your hearing is getting worse. · You have new symptoms, such as dizziness or nausea.            Tinnitus: Overview and Management Strategies          Many people have some ringing sounds in their ears once in a while. You may hear a roar, a hiss, a tinkle, or a buzz. The sound usually lasts only a few minutes. If it goes on all the time, you may have tinnitus. Tinnitus is usually caused by long-term exposure to loud noise. This damages the nerves in the inner ear. It can occur with all types of hearing loss. It may be a symptom of almost any ear problem. Tinnitus may be caused by a buildup of earwax. Or, it may be caused by ear infections or certain medicines (especially antibiotics or large amounts of aspirin). You can also hear noises in your ears because of an injury to the ears, drinking too much alcohol or caffeine, or a medical condition. Other conditions may also contribute to tinnitus, including: head and neck trauma, temporomandibular joint disorder (TMJ), sinus pressure and barometric trauma, traumatic brain injury, metabolic disorders, autoimmune disorders, stress, and high blood pressure. You may need tests to evaluate your hearing and to find causes of long-lasting tinnitus. Your doctor may suggest one or more treatments to help you cope with the tinnitus. You can also do things at home to help reduce symptoms. Causes of Tinnitus  We do not know the exact cause of tinnitus. One thing we do know is that you are not imagining it. If you have tinnitus, chances are the cause will remain a mystery. Conditions that might cause tinnitus include the following:    Hearing loss    Ménière's disease    Loud noise exposure    Migraines    Head injury    Drugs or medicines that are toxic to hearing    Anemia    High blood pressure    Stress    A lot of wax in the ear    Certain types of tumors    Having a lot of caffeine    Smoking cigarettes  You may find that your tinnitus is worse at night. This happens because it is quiet and you are not distracted.  Feeling tired and stressed may make your tinnitus worse.    Follow-up care is a key part of your treatment and safety. Be sure to make and go to all appointments, and call your doctor if you are having problems. It's also a good idea to know your test results and keep a list of the medicines you take. How can you care for yourself at home? · Limit or cut out alcohol, caffeine, and sodium. They can make your symptoms worse. · Do not smoke or use other tobacco products. Nicotine reduces blood flow to the ear and makes tinnitus worse. If you need help quitting, talk to your doctor about stop-smoking programs and medicines. These can increase your chances of quitting for good. · Talk to your doctor about whether to stop taking aspirin and similar products such as ibuprofen or naproxen. · Get exercise often. It can help improve blood flow to the ear. Hearing Aids and Other Devices  A hearing aid may help your tinnitus if you have a hearing loss. An audiologist can help you find and use the best hearing aid for you. Tinnitus maskers look like hearing aids. They make a sound that masks, or covers up, the tinnitus. The masking sound distracts you from the ringing in your ears. You may be able to use a masker and a hearing aid at the same time. Sound machines can be useful at night or during quiet times. There are machines you can buy at the store. Or, you can find apps on your phone that make sounds, like the ocean or rainfall. Fish tanks, fans, quiet music, and indoor waterfalls can help, as well. Ways to manage/cope with tinnitus  Some tinnitus may last a long time. To manage your tinnitus, try to:  · Avoid noises that you think caused your tinnitus. If you can't avoid loud noises, wear earplugs or earmuffs. · Ignore the sound by paying attention to other things. Keeping your brain busy with other tasks or background noise can help your brain not focus on the tinnitus. · Try to not give the tinnitus an emotional reaction.   Do your best to ignore the hearing loss does not get better within 1 week after an ear injury. · Your tinnitus bothers you enough that you want to take medicines to help you cope with it. If you notice changes in your tinnitus and/or your hearing, it is recommended that you have your hearing tested by your audiologist and to follow-up with your physician that manages your hearing loss (such as your ENT or Primary Care doctor). Learning About Hearing Aids  What is a hearing aid? A hearing aid makes sounds louder. It can help some people with hearing problems to hear better. Hearing aids do not restore normal hearing. But they can make it easier to communicate by making sounds clearer. · Digital programmable hearing aids can adjust themselves to work best where you are at any time. You also have more choices in setting them up than with analog hearing aids. There are also different styles of hearing aids. · A behind-the-ear (BTE) hearing aid connects to a plastic ear mold that fits inside the outer ear. BTE hearing aids are used for all levels of hearing loss, especially very severe hearing loss. They may be better for children for safety and growth reasons. Poorly fitting BTE ear molds or a buildup of earwax may cause a whistling sound (feedback). · An in-the-ear (ITE) hearing aid fits in the outer part of the ear. It can be used by people with mild to severe hearing loss. ITE hearing aids can be used with other hearing devices, such as a telecoil that improves hearing during phone calls. ITE hearing aids can be damaged by earwax and fluid draining from the ear. Their small size may be hard for some people to handle. They are not often used in children because the case must be replaced as the child grows. · An in-the-canal (ITC) hearing aid fits into the ear canal. ITC hearing aids are used by people with mild to moderate hearing loss.  They are made to fit the shape and the size of your ear canal. They can be damaged by

## 2021-06-16 ENCOUNTER — OFFICE VISIT (OUTPATIENT)
Dept: ORTHOPEDIC SURGERY | Age: 76
End: 2021-06-16
Payer: MEDICARE

## 2021-06-16 VITALS — HEIGHT: 74 IN | WEIGHT: 169 LBS | BODY MASS INDEX: 21.69 KG/M2 | TEMPERATURE: 98.6 F

## 2021-06-16 DIAGNOSIS — M47.816 FACET ARTHRITIS OF LUMBAR REGION: ICD-10-CM

## 2021-06-16 DIAGNOSIS — M25.559 HIP PAIN: Primary | ICD-10-CM

## 2021-06-16 PROCEDURE — G8420 CALC BMI NORM PARAMETERS: HCPCS | Performed by: ORTHOPAEDIC SURGERY

## 2021-06-16 PROCEDURE — 1036F TOBACCO NON-USER: CPT | Performed by: ORTHOPAEDIC SURGERY

## 2021-06-16 PROCEDURE — 1123F ACP DISCUSS/DSCN MKR DOCD: CPT | Performed by: ORTHOPAEDIC SURGERY

## 2021-06-16 PROCEDURE — G8427 DOCREV CUR MEDS BY ELIG CLIN: HCPCS | Performed by: ORTHOPAEDIC SURGERY

## 2021-06-16 PROCEDURE — 99214 OFFICE O/P EST MOD 30 MIN: CPT | Performed by: ORTHOPAEDIC SURGERY

## 2021-06-16 PROCEDURE — 4040F PNEUMOC VAC/ADMIN/RCVD: CPT | Performed by: ORTHOPAEDIC SURGERY

## 2021-06-16 RX ORDER — METHYLPREDNISOLONE 4 MG/1
TABLET ORAL
Qty: 1 KIT | Refills: 1 | Status: SHIPPED | OUTPATIENT
Start: 2021-06-16 | End: 2021-06-22

## 2021-06-16 NOTE — PROGRESS NOTES
Is a 79-year-old male who has had previous diagnosis of greater trochanteric bursitis. He was seen not quite a year ago and injected he is really been pain-free till recently. He does some core exercises some band resistance exercises and is also been doing a lot of yard work. He said he had a real hot spot posterior laterally on the right side recently. He said he thought something hit him here but he has no bruises or other abnormalities. He says sometimes he rolls with rolls over in bed and gets a sharp pain here. Sometimes it radiates down his leg and he points to the lateral thigh and inguinal goes about pen[facility]entiary down. He has noticed numbness or tingling. He has 1 kidney so he is advised not to take oral anti-inflammatory medications. ROS: Pertinent items are noted in HPI. No notes on file    Past Medical History:  No date: BPH (benign prostatic hyperplasia)  No date: Hypertension  No date: Pure hypercholesterolemia  No date: Renal cell cancer ([facility] [address]      Comment:  only has right kidney     Past Surgical History:  No date: COLONOSCOPY  No date: KNEE ARTHROSCOPY; Bilateral  03/28/2017: KNEE SURGERY; Right      Comment:  RIGHT KNEE ARTHROSCOPE PARTIAL MEDIAL MENISECTOMY WITH                MICRO FRACTURE OF MEDIAL FEMORAL CONDYLE  No date: TOTAL NEPHRECTOMY; Left      Comment:  CANCER  No date: TURP      Comment:  X 4    History reviewed. No pertinent family history. Social History    Socioeconomic History      Marital status:       Spouse name: None      Number of children: None      Years of education: None      Highest education level: None    Occupational History      None    Tobacco Use      Smoking status: Former Smoker        Packs/day: 1.00        Years: 5.00        Pack years: 5      Smokeless tobacco: Never Used    Substance and Sexual Activity      Alcohol use:  Yes        Alcohol/week: 0.0 standard drinks        Comment: 2 DRINKS A WEEK      Drug use: No      Sexual activity: None    Other Topics      Concerns:        None    Social History Narrative      None    Social Determinants of Health  Financial Resource Strain:       Difficulty of Paying Living Expenses:   Food Insecurity:       Worried About Running Out of Food in the Last Year:       Ran Out of Food in the Last Year:   Transportation Needs:       Lack of Transportation (Medical):       Lack of Transportation (Non-Medical):   Physical Activity:       Days of Exercise per Week:       Minutes of Exercise per Session:   Stress:       Feeling of Stress :   Social Connections:       Frequency of Communication with Friends and Family:       Frequency of Social Gatherings with Friends and Family:       Attends Yazidi Services:       Active Member of Clubs or Organizations:       Attends Club or Organization Meetings:       Marital Status:   Intimate Partner Violence:       Fear of Current or Ex-Partner:       Emotionally Abused:       Physically Abused:       Sexually Abused:     Current Outpatient Medications:  aspirin 81 MG tablet, Take 81 mg by mouth daily, Disp: , Rfl:   Multiple Vitamins-Minerals (THERAPEUTIC MULTIVITAMIN-MINERALS) tablet, Take 1 tablet by mouth daily, Disp: , Rfl:   lisinopril-hydrochlorothiazide (PRINZIDE;ZESTORETIC) 20-12.5 MG per tablet, Take 2 tablets by mouth daily , Disp: , Rfl:     No current facility-administered medications for this visit. -- Terbinafine -- Hives    VITAL SIGNS:  Temp 98.6 °F (37 °C)   Ht 6' 2\" (1.88 m)   Wt 169 lb (76.7 kg)   BMI 21.70 kg/m²   On examination today of the left hip he has almost knee-to-chest flexion with no pain. He denies groin pain. At 9 degrees flexion is 45 degrees both internal and external rotation again without pain. He has no trochanteric tenderness. He has no sciatic notch tenderness. He has negative straight leg raise. He has good flexibility of his IT band and good abduction strength.     Examination right hip shows symmetrical motion compared to the left. He has no trochanteric tenderness. He has tenderness really on the lateral side of the gluteus gamaliel. This hurts a little bit more when he does resisted hip extension. He has negative straight leg raise. He has no sciatic notch tenderness. This does cause pain in this area when he extends the lumbar spine or extends with right lateral bending. Sensibility appears normal in right lower extremity. 2 views of the right hip were obtained and 1 view of the lumbar spine. His lumbar spine shows a very small amount of spondylolisthesis of L3 on L4 but this is only about 10% or less. He has normal disc spaces. He does appear to have little narrowing of the L5-S1 facet joint with maybe a little osteophyte superiorly. There are no interosseous lesions or other obvious abnormalities. His hip x-rays show good joint space really no marginal osteophytes no intraosseous lesions or other abnormalities. Impression right lumbar facet arthrosis. Plan: Since she has 1 kidney we will try Medrol Dosepak to see if this will calm this down. We will see him back in 2 or 3 weeks. Examining both hips and the patient his lumbar spine reviewing x-rays and comparing his with previous x-rays explaining the pathology the patient explaining his medication to approximately 25 minutes.

## 2021-10-15 ENCOUNTER — OFFICE VISIT (OUTPATIENT)
Dept: ENT CLINIC | Age: 76
End: 2021-10-15
Payer: MEDICARE

## 2021-10-15 VITALS — HEART RATE: 65 BPM | SYSTOLIC BLOOD PRESSURE: 143 MMHG | TEMPERATURE: 97.6 F | DIASTOLIC BLOOD PRESSURE: 88 MMHG

## 2021-10-15 DIAGNOSIS — B97.89 VIRAL LARYNGITIS: Primary | ICD-10-CM

## 2021-10-15 DIAGNOSIS — J04.0 VIRAL LARYNGITIS: Primary | ICD-10-CM

## 2021-10-15 DIAGNOSIS — Z90.5 HX OF UNILATERAL NEPHRECTOMY: ICD-10-CM

## 2021-10-15 PROCEDURE — G8484 FLU IMMUNIZE NO ADMIN: HCPCS | Performed by: STUDENT IN AN ORGANIZED HEALTH CARE EDUCATION/TRAINING PROGRAM

## 2021-10-15 PROCEDURE — G8420 CALC BMI NORM PARAMETERS: HCPCS | Performed by: STUDENT IN AN ORGANIZED HEALTH CARE EDUCATION/TRAINING PROGRAM

## 2021-10-15 PROCEDURE — 99213 OFFICE O/P EST LOW 20 MIN: CPT | Performed by: STUDENT IN AN ORGANIZED HEALTH CARE EDUCATION/TRAINING PROGRAM

## 2021-10-15 PROCEDURE — 4040F PNEUMOC VAC/ADMIN/RCVD: CPT | Performed by: STUDENT IN AN ORGANIZED HEALTH CARE EDUCATION/TRAINING PROGRAM

## 2021-10-15 PROCEDURE — G8427 DOCREV CUR MEDS BY ELIG CLIN: HCPCS | Performed by: STUDENT IN AN ORGANIZED HEALTH CARE EDUCATION/TRAINING PROGRAM

## 2021-10-15 PROCEDURE — 1036F TOBACCO NON-USER: CPT | Performed by: STUDENT IN AN ORGANIZED HEALTH CARE EDUCATION/TRAINING PROGRAM

## 2021-10-15 PROCEDURE — 1123F ACP DISCUSS/DSCN MKR DOCD: CPT | Performed by: STUDENT IN AN ORGANIZED HEALTH CARE EDUCATION/TRAINING PROGRAM

## 2021-10-15 RX ORDER — METOPROLOL SUCCINATE 25 MG/1
TABLET, EXTENDED RELEASE ORAL
COMMUNITY
Start: 2021-09-09

## 2021-10-15 NOTE — PROGRESS NOTES
[address] VISIT      Patient Name: Chace Mccracken  Medical Record Number:  [id number]  Primary Care Physician:  Jamaica Fuller MD    ChiefComplaint     Chief Complaint   Patient presents with    Other     patient states he was sick two weeks ago with a fever, he has since recovered but has had an ongoing sore throat for 10 days, raspy voice. History of Present Illness     Chace Mccracken is an 67 y.o. male previously seen for otitis externa and tinnitus by Dr. Deanne Canela. Interval History:   2 weeks ago had a fever for 4 days, very bad sore throat, mucopurulent nasal drainage, cough. All symptoms resolved other than lingering sore throat. Sore throat has been improved and now very mild and classified as low grade. No medications over the last 2 weeks for this including antibiotics or steroids. COVID test was negative. Mild lingering hoarseness as well. Former smoker as a teenager for 5 years. He only has 1 kidney secondary to history of renal cell carcinoma. Past Medical History     Past Medical History:   Diagnosis Date    BPH (benign prostatic hyperplasia)     Hypertension     Pure hypercholesterolemia     Renal cell cancer ([facility] [address]     only has right kidney       Past Surgical History     Past Surgical History:   Procedure Laterality Date    COLONOSCOPY      KNEE ARTHROSCOPY Bilateral     KNEE SURGERY Right 03/28/2017    RIGHT KNEE ARTHROSCOPE PARTIAL MEDIAL MENISECTOMY WITH MICRO FRACTURE OF MEDIAL FEMORAL CONDYLE    TOTAL NEPHRECTOMY Left     CANCER    TURP      X 4       Family History     History reviewed. No pertinent family history. Social History     Social History     Tobacco Use    Smoking status: Former Smoker     Packs/day: 1.00     Years: 5.00     Pack years: 5.00    Smokeless tobacco: Never Used   Substance Use Topics    Alcohol use:  Yes     Alcohol/week: 0.0 standard drinks     Comment: 2 DRINKS A WEEK  Drug use: No        Allergies     Allergies   Allergen Reactions    Terbinafine Hives       Medications     Current Outpatient Medications   Medication Sig Dispense Refill    metoprolol succinate (TOPROL XL) 25 MG extended release tablet TAKE 1 TABLET DAILY      Multiple Vitamins-Minerals (THERAPEUTIC MULTIVITAMIN-MINERALS) tablet Take 1 tablet by mouth daily      lisinopril-hydrochlorothiazide (PRINZIDE;ZESTORETIC) 20-12.5 MG per tablet Take 2 tablets by mouth daily       aspirin 81 MG tablet Take 81 mg by mouth daily (Patient not taking: Reported on 10/15/2021)       No current facility-administered medications for this visit. Review of Systems     REVIEW OF SYSTEMS  The following systems were reviewed and revealed the following in addition to any already discussed in the HPI:    CONSTITUTIONAL: no weight loss, no fever, no night sweats, no chills  EYES: no vision changes, no blurry vision  EARS: no hearing loss, no otalgia  NOSE: no epistaxis, no rhinorrhea  THROAT: + Mild voice changes, + mild sore throat, no dysphagia    PhysicalExam     Vitals:    10/15/21 0941   BP: (!) 143/88   Site: Left Upper Arm   Position: Sitting   Cuff Size: Medium Adult   Pulse: 65   Temp: 97.6 °F (36.4 °C)   TempSrc: Temporal       PHYSICAL EXAM  BP (!) 143/88 (Site: Left Upper Arm, Position: Sitting, Cuff Size: Medium Adult)   Pulse 65   Temp 97.6 °F (36.4 °C) (Temporal)     GENERAL: No acute distress, alert and oriented, no hoarseness  EYES: EOMI, Anti-icteric  NOSE: On anterior rhinoscopy there is no epistaxis, nasal mucosa moist and normal appearing, no purulent drainage.    EARS: Normal external appearance; on portable otomicroscopy:     -Ad: External auditory canal without stenosis, tympanic membrane clear, no middle ear effusions or retractions     -As: External auditory canal without stenosis, tympanic membrane clear, no middle ear effusions or retractions  FACE: HB 1/6 bilaterally, symmetric appearing, sensation equal bilaterally  ORAL CAVITY: No masses or lesions visualized or palpated, uvula is midline, moist mucous membranes, symmetric 2+ tonsils, dentition without evidence of major decay  NECK: Normal range of motion, no thyromegaly, trachea is midline, no palpable lymphadenopathy or neck masses, no crepitus  CHEST: Normal respiratory effort, breathing comfortably, no retractions  SKIN: No rashes, normal appearing skin, no evidence of skin lesions/tumors  NEURO: Cranial Nerves 2, 3, 4, 5, 6, 7, 11, 12 intact bilaterally     I have performed a head and neck physical exam personally or was physically present during the key or critical portions of the service. Assessment and Plan     1. Viral laryngitis  -Likely with lingering laryngitis after viral illness 2 weeks ago. -Reassurance provided. Would hold off on antibiotics at this time as there is no evidence of pharyngeal infection.  -Can continue Tylenol as needed for pain. -If symptoms worsen or do not improve over the next 2 to 4 weeks he will follow-up for reevaluation    2. Hx of unilateral nephrectomy  -Avoid NSAIDs      Follow-Up     Return if symptoms worsen or fail to improve. Dr. [place] [id number]  Department of Otolaryngology/Head and Neck Surgery  10/15/21    Medical Decision Making: The following items were considered in medical decision making:  Independent review of images  Review / order clinical lab tests  Review / order radiology tests  Decision to obtain old records      This note was generated completely or in part utilizing Dragon dictation speech recognition software. Occasionally, words are mistranscribed and despite editing, the text may contain inaccuracies due to incorrect word recognition. If further clarification is needed please contact the office at [phone].

## 2024-11-27 ENCOUNTER — TELEPHONE (OUTPATIENT)
Dept: CARDIOLOGY CLINIC | Age: 79
End: 2024-11-27

## 2024-11-27 NOTE — TELEPHONE ENCOUNTER
Patient was referred by Dr. Blayne Dee to the [facility] location with Dr. Gentile.  Patient has been scheduled for 12/06 at 10:00am (am/pm).  Patient verbalized understanding of appointment instructions.  Call complete.

## 2024-12-06 ENCOUNTER — OFFICE VISIT (OUTPATIENT)
Dept: CARDIOLOGY CLINIC | Age: 79
End: 2024-12-06

## 2024-12-06 VITALS
DIASTOLIC BLOOD PRESSURE: 70 MMHG | BODY MASS INDEX: 21.82 KG/M2 | WEIGHT: 170 LBS | HEART RATE: 80 BPM | SYSTOLIC BLOOD PRESSURE: 136 MMHG | OXYGEN SATURATION: 100 % | HEIGHT: 74 IN

## 2024-12-06 DIAGNOSIS — I49.1 PAC (PREMATURE ATRIAL CONTRACTION): ICD-10-CM

## 2024-12-06 DIAGNOSIS — R07.9 CHEST PAIN, UNSPECIFIED TYPE: ICD-10-CM

## 2024-12-06 DIAGNOSIS — R42 DIZZINESS: Primary | ICD-10-CM

## 2024-12-06 DIAGNOSIS — I10 PRIMARY HYPERTENSION: ICD-10-CM

## 2024-12-06 RX ORDER — LISINOPRIL 20 MG/1
20 TABLET ORAL 2 TIMES DAILY
Qty: 180 TABLET | Refills: 4 | Status: SHIPPED | OUTPATIENT
Start: 2024-12-06

## 2024-12-06 NOTE — PATIENT INSTRUCTIONS
Stop Lisinopril/HCTZ  Take Lisinopril 20 mg twice a day  Continue other meds the same  3 day holter monitor  Follow up in Jan at [facility] office with a stress echo the same day  Have labs repeated a few days prior to appt

## 2024-12-06 NOTE — PROGRESS NOTES
[facility]  Cardiac Consult     Referring Provider:  Blayne Dee MD     Chief Complaint   Patient presents with    New Patient    Irregular Heart Beat    Dizziness        History of Present Illness:  78 y.o. male seen in consultation at the request of Dr. TAY Mcintyre for dizziness and PACs.    He is fairly active at home and cares for his wife who has dementia.  Last month he was cleaning the house and developed lightheadedness and dizziness with presyncope.  He sat down and took his blood pressure and says his blood pressure was high but the monitor said that his heart rate was irregular.  He had a neighbor that saw a cardiologist, Dr. Mackenzie, and so he called his office to try to get an appointment.  He was told by the  to go the emergency room.\\    He went to the emergency room at [facility].  He was found to have hypokalemia with potassium 3.3.  He had acute on chronic renal insufficiency with creatinine of 1.69.  He was felt to have dehydration was given fluids.  He was also given some potassium.  His monitor showed frequent PACs according to the ER note.  He was discharged and followed up with his PCP who ordered a 2-day Holter.  He says he tried to get this done at [facility] but they no longer do these.  He is now seen here for evaluation.    He is feeling fairly well.  He has had no further syncope or presyncope.  He has no palpitations.  His monitor had stopped showing PACs but now again shows an irregular rhythm.  He had repeat labs that had normalized with his creatinine back to baseline about 1.3.  His potassium was 4.1.  He does have some lower chest discomfort that tends to come with activity.  He feels like this may be GI related however.    Past Medical History:   has a past medical history of BPH (benign prostatic hyperplasia), Hypertension, Pure hypercholesterolemia, and Renal cell cancer (HCC).    Surgical History:   has a past surgical history that includes total

## 2024-12-18 ENCOUNTER — TELEPHONE (OUTPATIENT)
Dept: CARDIOLOGY CLINIC | Age: 79
End: 2024-12-18

## 2024-12-18 NOTE — TELEPHONE ENCOUNTER
Left message for patient about results and reminded of stress echo and ov on 1/10 at [place] office. Asked him to call back with any questions.

## 2024-12-18 NOTE — TELEPHONE ENCOUNTER
----- Message from Dr. Bo Gentile MD sent at 12/18/2024  3:54 PM EST -----  Monitor shows early beats but nothing of concern. Avoid caffeine. F/u as planned.    TONEK

## 2025-01-02 ENCOUNTER — OFFICE VISIT (OUTPATIENT)
Dept: PRIMARY CARE CLINIC | Age: 80
End: 2025-01-02

## 2025-01-02 VITALS
BODY MASS INDEX: 21.95 KG/M2 | OXYGEN SATURATION: 99 % | SYSTOLIC BLOOD PRESSURE: 160 MMHG | DIASTOLIC BLOOD PRESSURE: 90 MMHG | WEIGHT: 171 LBS | HEART RATE: 82 BPM

## 2025-01-02 DIAGNOSIS — Z90.5 ACQUIRED ABSENCE OF LEFT KIDNEY: ICD-10-CM

## 2025-01-02 DIAGNOSIS — I49.1 PAC (PREMATURE ATRIAL CONTRACTION): ICD-10-CM

## 2025-01-02 DIAGNOSIS — I10 PRIMARY HYPERTENSION: Primary | ICD-10-CM

## 2025-01-02 RX ORDER — FLUOCINONIDE 0.5 MG/G
CREAM TOPICAL
Qty: 60 G | Refills: 0 | Status: SHIPPED | OUTPATIENT
Start: 2025-01-02

## 2025-01-02 RX ORDER — AMLODIPINE BESYLATE 2.5 MG/1
2.5 TABLET ORAL DAILY
Qty: 90 TABLET | Refills: 1 | Status: SHIPPED | OUTPATIENT
Start: 2025-01-02

## 2025-01-02 SDOH — ECONOMIC STABILITY: FOOD INSECURITY: WITHIN THE PAST 12 MONTHS, YOU WORRIED THAT YOUR FOOD WOULD RUN OUT BEFORE YOU GOT MONEY TO BUY MORE.: NEVER TRUE

## 2025-01-02 SDOH — ECONOMIC STABILITY: INCOME INSECURITY: HOW HARD IS IT FOR YOU TO PAY FOR THE VERY BASICS LIKE FOOD, HOUSING, MEDICAL CARE, AND HEATING?: NOT HARD AT ALL

## 2025-01-02 SDOH — ECONOMIC STABILITY: FOOD INSECURITY: WITHIN THE PAST 12 MONTHS, THE FOOD YOU BOUGHT JUST DIDN'T LAST AND YOU DIDN'T HAVE MONEY TO GET MORE.: NEVER TRUE

## 2025-01-02 ASSESSMENT — ANXIETY QUESTIONNAIRES
3. WORRYING TOO MUCH ABOUT DIFFERENT THINGS: NOT AT ALL
IF YOU CHECKED OFF ANY PROBLEMS ON THIS QUESTIONNAIRE, HOW DIFFICULT HAVE THESE PROBLEMS MADE IT FOR YOU TO DO YOUR WORK, TAKE CARE OF THINGS AT HOME, OR GET ALONG WITH OTHER PEOPLE: NOT DIFFICULT AT ALL
7. FEELING AFRAID AS IF SOMETHING AWFUL MIGHT HAPPEN: NOT AT ALL
6. BECOMING EASILY ANNOYED OR IRRITABLE: NOT AT ALL
2. NOT BEING ABLE TO STOP OR CONTROL WORRYING: SEVERAL DAYS
1. FEELING NERVOUS, ANXIOUS, OR ON EDGE: SEVERAL DAYS
4. TROUBLE RELAXING: SEVERAL DAYS
5. BEING SO RESTLESS THAT IT IS HARD TO SIT STILL: NOT AT ALL
GAD7 TOTAL SCORE: 3

## 2025-01-02 ASSESSMENT — PATIENT HEALTH QUESTIONNAIRE - PHQ9
2. FEELING DOWN, DEPRESSED OR HOPELESS: NOT AT ALL
SUM OF ALL RESPONSES TO PHQ QUESTIONS 1-9: 0
1. LITTLE INTEREST OR PLEASURE IN DOING THINGS: NOT AT ALL
SUM OF ALL RESPONSES TO PHQ QUESTIONS 1-9: 0
SUM OF ALL RESPONSES TO PHQ9 QUESTIONS 1 & 2: 0
SUM OF ALL RESPONSES TO PHQ QUESTIONS 1-9: 0
SUM OF ALL RESPONSES TO PHQ QUESTIONS 1-9: 0

## 2025-01-02 NOTE — PROGRESS NOTES
Martin Ann (:  1945) is a 79 y.o. male,New patient, here for evaluation of the following chief complaint(s):  New Patient (Here to establish care, Has heart issues but sees Dr. Ferreira. )      SUBJECTIVE/OBJECTIVE:  HPI    Patient seen to establish care.    Patient has a h/o HTN. He also has a surgically removed left kidney and right kidney has a cyst.     He was seen in the ER in November for irregular heart beat and feeling dizzy. He was diagnosed with PACs. He was seen by cardiologist, Dr Gentile. He had Holter monitor placed. He was advised to stop HCTZ due to low potassium in the ER and decreased kidney function. He is still taking lisinopril. He has not yet gone for repeat potassium and GFR.    Patient says he often has elevated blood pressure at doctor's office, but lately it has been a little high at home as well. SBP is often in the 140s, sometimes 150. It has been elevated since stopping HCTZ.     Otherwise, he has been pretty healthy most of his life. He has had 3 knee surgeries, but attributes this to being a runner when he was younger. He continues to exercise regularly, usually 1.5 hours 3 times a week. He uses the [employer] for cardio, uses resistance bands, crunches, and leg lifts for resistance training.    He cooks most of their meals at home and tries to make sure they eat healthy. They eat a lot of salads with tomatoes. They try to avoid high sodium foods.    Patient complains of occasional dry itchy skin on legs. In the past he was prescribed fluocinonide but he is almost out. He uses only a small amount only a few times a year and it helps a lot with symptoms.    Review of Systems   Constitutional:  Negative for fatigue and fever.   HENT:  Negative for congestion and sore throat.    Eyes:  Negative for pain and visual disturbance.   Respiratory:  Negative for cough and shortness of breath.    Cardiovascular:  Positive for palpitations. Negative for chest pain.

## 2025-01-03 ENCOUNTER — TELEPHONE (OUTPATIENT)
Dept: CARDIOLOGY CLINIC | Age: 80
End: 2025-01-03

## 2025-01-03 PROBLEM — I49.1 PAC (PREMATURE ATRIAL CONTRACTION): Status: ACTIVE | Noted: 2025-01-03

## 2025-01-03 PROBLEM — Z90.5: Status: ACTIVE | Noted: 2025-01-03

## 2025-01-03 PROBLEM — I10 PRIMARY HYPERTENSION: Status: ACTIVE | Noted: 2025-01-03

## 2025-01-03 LAB
ANION GAP SERPL CALCULATED.3IONS-SCNC: 13 MMOL/L (ref 3–16)
BUN SERPL-MCNC: 17 MG/DL (ref 7–20)
CALCIUM SERPL-MCNC: 9.8 MG/DL (ref 8.3–10.6)
CHLORIDE SERPL-SCNC: 104 MMOL/L (ref 99–110)
CO2 SERPL-SCNC: 25 MMOL/L (ref 21–32)
CREAT SERPL-MCNC: 1.2 MG/DL (ref 0.8–1.3)
GFR SERPLBLD CREATININE-BSD FMLA CKD-EPI: 61 ML/MIN/{1.73_M2}
GLUCOSE SERPL-MCNC: 90 MG/DL (ref 70–99)
POTASSIUM SERPL-SCNC: 3.8 MMOL/L (ref 3.5–5.1)
SODIUM SERPL-SCNC: 142 MMOL/L (ref 136–145)

## 2025-01-03 ASSESSMENT — ENCOUNTER SYMPTOMS
EYE PAIN: 0
SORE THROAT: 0
ABDOMINAL PAIN: 0
SHORTNESS OF BREATH: 0
COUGH: 0

## 2025-01-03 NOTE — TELEPHONE ENCOUNTER
----- Message from Dr. Bo Gentile MD sent at 1/3/2025 10:49 AM EST -----  Tell pt. their labs are good. F/u in clinic as planned.      BOLIVAR

## 2025-01-03 NOTE — TELEPHONE ENCOUNTER
Patient wanted to update MMK-   BP's have been on average 142/72 and his new PCP started Norvasc 2.5 mg daily. He will start tomorrow and keep track of BP's and bring list to apt 1/10.

## 2025-01-03 NOTE — TELEPHONE ENCOUNTER
Patient is asking for Ev to call, he states he needs to touch base with her on several things prior to his upcoming appt with MMK and ECHO on 1/10/25.  Please call to discuss [phone]

## 2025-01-10 ENCOUNTER — OFFICE VISIT (OUTPATIENT)
Dept: CARDIOLOGY CLINIC | Age: 80
End: 2025-01-10
Payer: MEDICARE

## 2025-01-10 ENCOUNTER — TELEPHONE (OUTPATIENT)
Dept: CARDIOLOGY CLINIC | Age: 80
End: 2025-01-10

## 2025-01-10 VITALS
OXYGEN SATURATION: 99 % | WEIGHT: 161 LBS | BODY MASS INDEX: 20.66 KG/M2 | HEIGHT: 74 IN | DIASTOLIC BLOOD PRESSURE: 90 MMHG | SYSTOLIC BLOOD PRESSURE: 154 MMHG | HEART RATE: 81 BPM

## 2025-01-10 DIAGNOSIS — R94.39 ABNORMAL STRESS ECHOCARDIOGRAM: ICD-10-CM

## 2025-01-10 DIAGNOSIS — R42 DIZZINESS: ICD-10-CM

## 2025-01-10 DIAGNOSIS — R07.9 CHEST PAIN, UNSPECIFIED TYPE: Primary | ICD-10-CM

## 2025-01-10 DIAGNOSIS — I49.1 PAC (PREMATURE ATRIAL CONTRACTION): ICD-10-CM

## 2025-01-10 DIAGNOSIS — I10 PRIMARY HYPERTENSION: ICD-10-CM

## 2025-01-10 PROCEDURE — G8420 CALC BMI NORM PARAMETERS: HCPCS | Performed by: INTERNAL MEDICINE

## 2025-01-10 PROCEDURE — G8427 DOCREV CUR MEDS BY ELIG CLIN: HCPCS | Performed by: INTERNAL MEDICINE

## 2025-01-10 PROCEDURE — 3077F SYST BP >= 140 MM HG: CPT | Performed by: INTERNAL MEDICINE

## 2025-01-10 PROCEDURE — 3080F DIAST BP >= 90 MM HG: CPT | Performed by: INTERNAL MEDICINE

## 2025-01-10 PROCEDURE — 1123F ACP DISCUSS/DSCN MKR DOCD: CPT | Performed by: INTERNAL MEDICINE

## 2025-01-10 PROCEDURE — 99214 OFFICE O/P EST MOD 30 MIN: CPT | Performed by: INTERNAL MEDICINE

## 2025-01-10 PROCEDURE — 1159F MED LIST DOCD IN RCRD: CPT | Performed by: INTERNAL MEDICINE

## 2025-01-10 PROCEDURE — 1036F TOBACCO NON-USER: CPT | Performed by: INTERNAL MEDICINE

## 2025-01-10 RX ORDER — ASPIRIN 81 MG/1
81 TABLET ORAL DAILY
COMMUNITY
Start: 2025-01-10

## 2025-01-10 RX ORDER — ATORVASTATIN CALCIUM 40 MG/1
40 TABLET, FILM COATED ORAL DAILY
Qty: 90 TABLET | Refills: 4 | Status: SHIPPED | OUTPATIENT
Start: 2025-01-10

## 2025-01-10 RX ORDER — AMLODIPINE BESYLATE 5 MG/1
5 TABLET ORAL DAILY
Qty: 90 TABLET | Refills: 4 | Status: SHIPPED | OUTPATIENT
Start: 2025-01-10

## 2025-01-10 NOTE — PROGRESS NOTES
[facility]  Cardiac Consult     Referring Provider:  Latosha Carcamo MD     Chief Complaint   Patient presents with    Follow-up    Hypertension     F/u stress echo         History of Present Illness:  79 y.o. male seen in follow-up for dizziness and PACs.    He was seen as a new pt one month ago.     He is fairly active at home and cares for his wife who has dementia.  Last month he was cleaning the house and developed lightheadedness and dizziness with presyncope.  He sat down and took his blood pressure and says his blood pressure was high but the monitor said that his heart rate was irregular.  He had a neighbor that saw a cardiologist, Dr. Mackenzie, and so he called his office to try to get an appointment.  He was told by the  to go the emergency room.\\    He went to the emergency room at [facility].  He was found to have hypokalemia with potassium 3.3.  He had acute on chronic renal insufficiency with creatinine of 1.69.  He was felt to have dehydration was given fluids.  He was also given some potassium.  His monitor showed frequent PACs according to the ER note.  He was discharged and followed up with his PCP who ordered a 2-day Holter.  He says he tried to get this done at [facility] but they no longer do these.  He is now seen here for evaluation.    He is feeling fairly well.  He has had no further syncope or presyncope.  He has no palpitations.  His monitor had stopped showing PACs but now again shows an irregular rhythm.  He had repeat labs that had normalized with his creatinine back to baseline about 1.3.  His potassium was 4.1.  He does have some lower chest discomfort that tends to come with activity.  He feels like this may be GI related however.    He returns today for stress echo.  It shows limited exercise tolerance.  EKG was negative for ischemia with occasional PAC and PVC.  Baseline echo showed borderline LV function with ejection fraction of about 50%.  With exercise

## 2025-01-10 NOTE — TELEPHONE ENCOUNTER
Needs LHC with any interventional doctor soon. Abnormal stress echo.    Left Heart Cath Patient Pre-procedure Instructions    Do NOT eat or drink anything after midnight morning of procedure    MEDICATIONS:  Your medications have been reviewed. Please follow medication instructions below  It is okay to drink a sip of water with meds morning of procedure  It is okay to take ALL medications morning of procedure    Transportation: Bring someone to drive you home.     Scheduling: Cally ROSE is our full time procedure . She will call you to schedule your procedure.    Allergies: Do you have an allergy to dye or contrast? No.    Labs: We need to check blood work within 30 days of your procedure. Please go to any [facility] lab to get your labs drawn prior to your procedure. If not completed prior to your procedure, they will be checked morning of your procedure.

## 2025-01-10 NOTE — TELEPHONE ENCOUNTER
Date of Procedure[facility]     Time of arrival: 8:30 am     Procedure time: 10:00 am     Spoke to Fredrick and he is agreeable to date and time. Reviewed instructions and he verbalized understanding. Encouraged to call with any questions or concerns.     Published on [employer], scheduled on [employer] and e-mailed to cath lab.

## 2025-01-10 NOTE — PATIENT INSTRUCTIONS
Labs today  Start Aspirin 81 mg daily  Start Lipitor 40 mg daily  Start Norvasc 5 mg daily    Cally will call you to schedule heart catheterization    Left Heart Cath Patient Pre-procedure Instructions    Do NOT eat or drink anything after midnight morning of procedure    MEDICATIONS:  Your medications have been reviewed. Please follow medication instructions below  It is okay to drink a sip of water with meds morning of procedure  It is okay to take ALL medications morning of procedure    Transportation: Bring someone to drive you home.     Scheduling: Cally ROSE is our full time procedure . She will call you to schedule your procedure.    Allergies: Do you have an allergy to dye or contrast? No.    Labs: We need to check blood work within 30 days of your procedure. Please go to any [facility] lab to get your labs drawn prior to your procedure. If not completed prior to your procedure, they will be checked morning of your procedure.

## 2025-01-13 ENCOUNTER — TELEPHONE (OUTPATIENT)
Dept: CARDIOLOGY CLINIC | Age: 80
End: 2025-01-13

## 2025-01-13 DIAGNOSIS — R07.9 CHEST PAIN, UNSPECIFIED TYPE: ICD-10-CM

## 2025-01-13 LAB
DEPRECATED RDW RBC AUTO: 13.4 % (ref 12.4–15.4)
HCT VFR BLD AUTO: 41.7 % (ref 40.5–52.5)
HGB BLD-MCNC: 14.4 G/DL (ref 13.5–17.5)
MCH RBC QN AUTO: 32.2 PG (ref 26–34)
MCHC RBC AUTO-ENTMCNC: 34.5 G/DL (ref 31–36)
MCV RBC AUTO: 93.2 FL (ref 80–100)
PLATELET # BLD AUTO: 204 K/UL (ref 135–450)
PMV BLD AUTO: 9.9 FL (ref 5–10.5)
RBC # BLD AUTO: 4.47 M/UL (ref 4.2–5.9)
WBC # BLD AUTO: 5.8 K/UL (ref 4–11)

## 2025-01-13 NOTE — TELEPHONE ENCOUNTER
Patient is requestinga call from someone in 's staff regarding what if happen after the cath. He has concerns because his wife has dementia and his children are unable to watch her if he is admitted after the procedure.    Please contact patient to discuss this as well as other questions

## 2025-01-14 ENCOUNTER — TELEPHONE (OUTPATIENT)
Dept: CARDIOLOGY CLINIC | Age: 80
End: 2025-01-14

## 2025-01-14 NOTE — PRE SEDATION
Brief Pre-Op Note/Sedation Assessment      Martin Ann  1945  [id number]  10:50 AM    Planned Procedure: Cardiac Catheterization Procedure  Post Procedure Plan: Return to same level of care  Consent: I have discussed with the patient and/or the patient representative the indication, alternatives, and the possible risks and/or complications of the planned procedure and the anesthesia methods. The patient and/or patient representative appear to understand and agree to proceed.        Chief Complaint:   Chest Pain/Pressure  Dyspnea on Exertion  Dyspnea  Fatigue      Indications for Cath Procedure:  Presentation:  New Onset Angina <= 2 months and Suspected CAD  2.  Anginal Classification within 2 weeks:  CCS III - Symptoms with everyday living activities, i.e., moderate limitation  3.  Angina Symptoms Assessment:  Typical Chest Pain  4.  Heart Failure Class within last 2 weeks:  No symptoms  5.  Cardiovascular Instability:  No    Prior Ischemic Workup/Eval:  Pre-Procedural Medications: Yes: ACE/ARB/ARNI, Aspirin, Beta Blockers, Ca Channel Blockers, and STATIN  2.   Stress Test Completed?  Yes:  Stress or Imaging Studies Performed (within ANY time period):   Type:  Stress Echo  Results:  Positive:  Wall Motion Changes (Echo) Extent of Ischemia:  High Risk (>3% annual death or MI)    Does Patient need surgery?  Cath Valve Surgery:  No    Pre-Procedure Medical History:  Vital Signs:  BP (!) 170/90   Pulse 96   Resp 18   Ht 1.88 m (6' 2\")   Wt 73 kg (161 lb)   SpO2 99%   BMI 20.67 kg/m²     Allergies:    Allergies   Allergen Reactions    Terbinafine Hives     Medications:    No current facility-administered medications for this encounter.       Past Medical History:    Past Medical History:   Diagnosis Date    BPH (benign prostatic hyperplasia)     Hypertension     Pure hypercholesterolemia     Renal cell cancer (HCC)     only has right kidney       Surgical History:    Past Surgical History:   Procedure

## 2025-01-14 NOTE — TELEPHONE ENCOUNTER
----- Message from Dr. Bo Gentile MD sent at 1/14/2025  9:04 AM EST -----  Tell pt. their labs are good. F/u in clinic as planned.      BOLIVAR

## 2025-01-15 ENCOUNTER — APPOINTMENT (OUTPATIENT)
Dept: CT IMAGING | Age: 80
End: 2025-01-15
Attending: INTERNAL MEDICINE
Payer: MEDICARE

## 2025-01-15 ENCOUNTER — HOSPITAL ENCOUNTER (OUTPATIENT)
Age: 80
Setting detail: OUTPATIENT SURGERY
Discharge: HOME | End: 2025-01-15
Attending: INTERNAL MEDICINE | Admitting: INTERNAL MEDICINE
Payer: MEDICARE

## 2025-01-15 VITALS
OXYGEN SATURATION: 99 % | SYSTOLIC BLOOD PRESSURE: 137 MMHG | HEIGHT: 74 IN | DIASTOLIC BLOOD PRESSURE: 81 MMHG | HEART RATE: 54 BPM | RESPIRATION RATE: 14 BRPM | BODY MASS INDEX: 20.66 KG/M2 | WEIGHT: 161 LBS

## 2025-01-15 DIAGNOSIS — R94.39 ABNORMAL STRESS ECHOCARDIOGRAM: ICD-10-CM

## 2025-01-15 PROBLEM — Q24.5 ANOMALOUS CORONARY ARTERY ORIGIN: Status: ACTIVE | Noted: 2025-01-15

## 2025-01-15 LAB
ANION GAP SERPL CALCULATED.3IONS-SCNC: 11 MMOL/L (ref 3–16)
BUN SERPL-MCNC: 15 MG/DL (ref 7–20)
CALCIUM SERPL-MCNC: 9.8 MG/DL (ref 8.3–10.6)
CHLORIDE SERPL-SCNC: 105 MMOL/L (ref 99–110)
CO2 SERPL-SCNC: 26 MMOL/L (ref 21–32)
CREAT SERPL-MCNC: 1.2 MG/DL (ref 0.8–1.3)
DEPRECATED RDW RBC AUTO: 13.4 % (ref 12.4–15.4)
ECHO BSA: 1.95 M2
EKG ATRIAL RATE: 96 BPM
EKG DIAGNOSIS: NORMAL
EKG P AXIS: 44 DEGREES
EKG P-R INTERVAL: 158 MS
EKG Q-T INTERVAL: 352 MS
EKG QRS DURATION: 98 MS
EKG QTC CALCULATION (BAZETT): 444 MS
EKG R AXIS: -38 DEGREES
EKG T AXIS: 29 DEGREES
EKG VENTRICULAR RATE: 96 BPM
GFR SERPLBLD CREATININE-BSD FMLA CKD-EPI: 61 ML/MIN/{1.73_M2}
GLUCOSE SERPL-MCNC: 122 MG/DL (ref 70–99)
HCT VFR BLD AUTO: 43 % (ref 40.5–52.5)
HGB BLD-MCNC: 14.8 G/DL (ref 13.5–17.5)
MCH RBC QN AUTO: 31.5 PG (ref 26–34)
MCHC RBC AUTO-ENTMCNC: 34.4 G/DL (ref 31–36)
MCV RBC AUTO: 91.5 FL (ref 80–100)
PLATELET # BLD AUTO: 206 K/UL (ref 135–450)
PMV BLD AUTO: 9 FL (ref 5–10.5)
POTASSIUM SERPL-SCNC: 3.8 MMOL/L (ref 3.5–5.1)
RBC # BLD AUTO: 4.69 M/UL (ref 4.2–5.9)
SODIUM SERPL-SCNC: 142 MMOL/L (ref 136–145)
WBC # BLD AUTO: 5.5 K/UL (ref 4–11)

## 2025-01-15 PROCEDURE — 93458 L HRT ARTERY/VENTRICLE ANGIO: CPT | Performed by: INTERNAL MEDICINE

## 2025-01-15 PROCEDURE — C1894 INTRO/SHEATH, NON-LASER: HCPCS | Performed by: INTERNAL MEDICINE

## 2025-01-15 PROCEDURE — C1769 GUIDE WIRE: HCPCS | Performed by: INTERNAL MEDICINE

## 2025-01-15 PROCEDURE — 93005 ELECTROCARDIOGRAM TRACING: CPT

## 2025-01-15 PROCEDURE — 7100000010 HC PHASE II RECOVERY - FIRST 15 MIN: Performed by: INTERNAL MEDICINE

## 2025-01-15 PROCEDURE — 2500000003 HC RX 250 WO HCPCS: Performed by: INTERNAL MEDICINE

## 2025-01-15 PROCEDURE — 2709999900 HC NON-CHARGEABLE SUPPLY: Performed by: INTERNAL MEDICINE

## 2025-01-15 PROCEDURE — 99152 MOD SED SAME PHYS/QHP 5/>YRS: CPT | Performed by: INTERNAL MEDICINE

## 2025-01-15 PROCEDURE — 7100000011 HC PHASE II RECOVERY - ADDTL 15 MIN: Performed by: INTERNAL MEDICINE

## 2025-01-15 PROCEDURE — 6360000004 HC RX CONTRAST MEDICATION: Performed by: INTERNAL MEDICINE

## 2025-01-15 PROCEDURE — 99153 MOD SED SAME PHYS/QHP EA: CPT | Performed by: INTERNAL MEDICINE

## 2025-01-15 PROCEDURE — 85027 COMPLETE CBC AUTOMATED: CPT

## 2025-01-15 PROCEDURE — 36415 COLL VENOUS BLD VENIPUNCTURE: CPT

## 2025-01-15 PROCEDURE — 75574 CT ANGIO HRT W/3D IMAGE: CPT

## 2025-01-15 PROCEDURE — 75573 CT HRT C+ STRUX CGEN HRT DS: CPT | Performed by: INTERNAL MEDICINE

## 2025-01-15 PROCEDURE — 6360000002 HC RX W HCPCS: Performed by: INTERNAL MEDICINE

## 2025-01-15 PROCEDURE — 80048 BASIC METABOLIC PNL TOTAL CA: CPT

## 2025-01-15 RX ORDER — METOPROLOL TARTRATE 50 MG
50 TABLET ORAL
Status: DISCONTINUED | OUTPATIENT
Start: 2025-01-15 | End: 2025-01-16 | Stop reason: HOSPADM

## 2025-01-15 RX ORDER — METOPROLOL TARTRATE 50 MG
150 TABLET ORAL
Status: DISCONTINUED | OUTPATIENT
Start: 2025-01-15 | End: 2025-01-16 | Stop reason: HOSPADM

## 2025-01-15 RX ORDER — IOPAMIDOL 755 MG/ML
INJECTION, SOLUTION INTRAVASCULAR PRN
Status: DISCONTINUED | OUTPATIENT
Start: 2025-01-15 | End: 2025-01-15 | Stop reason: HOSPADM

## 2025-01-15 RX ORDER — IOPAMIDOL 755 MG/ML
120 INJECTION, SOLUTION INTRAVASCULAR
Status: COMPLETED | OUTPATIENT
Start: 2025-01-15 | End: 2025-01-15

## 2025-01-15 RX ORDER — NITROGLYCERIN 0.4 MG/1
0.8 TABLET SUBLINGUAL
Status: DISCONTINUED | OUTPATIENT
Start: 2025-01-15 | End: 2025-01-16 | Stop reason: HOSPADM

## 2025-01-15 RX ORDER — MIDAZOLAM HYDROCHLORIDE 1 MG/ML
INJECTION, SOLUTION INTRAMUSCULAR; INTRAVENOUS PRN
Status: DISCONTINUED | OUTPATIENT
Start: 2025-01-15 | End: 2025-01-15 | Stop reason: HOSPADM

## 2025-01-15 RX ORDER — METOPROLOL TARTRATE 50 MG
100 TABLET ORAL
Status: DISCONTINUED | OUTPATIENT
Start: 2025-01-15 | End: 2025-01-16 | Stop reason: HOSPADM

## 2025-01-15 RX ORDER — FENTANYL CITRATE 50 UG/ML
INJECTION, SOLUTION INTRAMUSCULAR; INTRAVENOUS PRN
Status: DISCONTINUED | OUTPATIENT
Start: 2025-01-15 | End: 2025-01-15 | Stop reason: HOSPADM

## 2025-01-15 RX ORDER — NITROGLYCERIN 0.4 MG/1
0.4 TABLET SUBLINGUAL
Status: DISCONTINUED | OUTPATIENT
Start: 2025-01-15 | End: 2025-01-16 | Stop reason: HOSPADM

## 2025-01-15 RX ORDER — METOPROLOL SUCCINATE 100 MG/1
50 TABLET, EXTENDED RELEASE ORAL DAILY
Qty: 90 TABLET | Refills: 3 | Status: SHIPPED | OUTPATIENT
Start: 2025-01-15

## 2025-01-15 RX ORDER — SODIUM CHLORIDE 0.9 % (FLUSH) 0.9 %
5-40 SYRINGE (ML) INJECTION PRN
Status: DISCONTINUED | OUTPATIENT
Start: 2025-01-15 | End: 2025-01-21 | Stop reason: HOSPADM

## 2025-01-15 RX ORDER — METOPROLOL TARTRATE 1 MG/ML
5 INJECTION, SOLUTION INTRAVENOUS EVERY 5 MIN PRN
Status: COMPLETED | OUTPATIENT
Start: 2025-01-15 | End: 2025-01-15

## 2025-01-15 RX ORDER — SODIUM CHLORIDE 0.9 % (FLUSH) 0.9 %
5-40 SYRINGE (ML) INJECTION EVERY 12 HOURS SCHEDULED
Status: DISCONTINUED | OUTPATIENT
Start: 2025-01-15 | End: 2025-01-21 | Stop reason: HOSPADM

## 2025-01-15 RX ORDER — SODIUM CHLORIDE 9 MG/ML
INJECTION, SOLUTION INTRAVENOUS PRN
Status: DISCONTINUED | OUTPATIENT
Start: 2025-01-15 | End: 2025-01-21 | Stop reason: HOSPADM

## 2025-01-15 RX ADMIN — IOPAMIDOL 120 ML: 755 INJECTION, SOLUTION INTRAVENOUS at 12:58

## 2025-01-15 RX ADMIN — METOPROLOL TARTRATE 5 MG: 1 INJECTION, SOLUTION INTRAVENOUS at 13:04

## 2025-01-15 RX ADMIN — METOPROLOL TARTRATE 5 MG: 1 INJECTION, SOLUTION INTRAVENOUS at 12:29

## 2025-01-15 RX ADMIN — METOPROLOL TARTRATE 5 MG: 1 INJECTION, SOLUTION INTRAVENOUS at 12:17

## 2025-01-15 RX ADMIN — METOPROLOL TARTRATE 5 MG: 1 INJECTION, SOLUTION INTRAVENOUS at 13:10

## 2025-01-15 NOTE — H&P
[facility]  Cardiac Consult     Referring Provider:  Latosha Carcamo MD     No chief complaint on file.       History of Present Illness:  79 y.o. male seen in follow-up for dizziness and PACs.    He was seen as a new pt one month ago.     He is fairly active at home and cares for his wife who has dementia.  Last month he was cleaning the house and developed lightheadedness and dizziness with presyncope.  He sat down and took his blood pressure and says his blood pressure was high but the monitor said that his heart rate was irregular.  He had a neighbor that saw a cardiologist, Dr. Mackenzie, and so he called his office to try to get an appointment.  He was told by the  to go the emergency room.\\    He went to the emergency room at [facility]  He was found to have hypokalemia with potassium 3.3.  He had acute on chronic renal insufficiency with creatinine of 1.69.  He was felt to have dehydration was given fluids.  He was also given some potassium.  His monitor showed frequent PACs according to the ER note.  He was discharged and followed up with his PCP who ordered a 2-day Holter.  He says he tried to get this done at [facility] but they no longer do these.  He is now seen here for evaluation.    He is feeling fairly well.  He has had no further syncope or presyncope.  He has no palpitations.  His monitor had stopped showing PACs but now again shows an irregular rhythm.  He had repeat labs that had normalized with his creatinine back to baseline about 1.3.  His potassium was 4.1.  He does have some lower chest discomfort that tends to come with activity.  He feels like this may be GI related however.    He returns today for stress echo.  It shows limited exercise tolerance.  EKG was negative for ischemia with occasional PAC and PVC.  Baseline echo showed borderline LV function with ejection fraction of about 50%.  With exercise there was dilation of the LV with worsening of LV function.

## 2025-01-15 NOTE — FLOWSHEET NOTE
Radial band removed.  Site unremarkable.  Up to bathroom without difficulty and says he feels great.  Ready to go home.

## 2025-01-15 NOTE — DISCHARGE INSTRUCTIONS
Radial Angiogram      Care of your puncture site:  Remove clear bandage 24 hours after the procedure.  May shower in 24 hours  Inspect the site daily and gently clean using soap and water.  Dry thoroughly and apply a Band-Aid.    Normal Observations:  Soreness or tenderness which may last one week.  Mild oozing from the incision site.  Possible bruising that could last 2 weeks.    Activity:  You may resume driving 24 hours following the procedure.  You may resume normal activity in 3 days or after the wound heals.  Avoid lifting more than 10 pounds for 3 days with affected arm.    Nutrition:  Regular diet   Drink at least 8 to 10 glasses of decaffeinated, non-alcoholic fluid for the next 24 hours to flush the x-ray dye used for your angiogram out of your body.    Call your doctor immediately if your condition worsens, for any other concerns, for a follow-up appointment or if you experience any of the following:  Significant bleeding that does not stop after 10 minutes of applying firm pressure on the puncture site.  Increased swelling of the wrist.  Unusual pain, numbness, or tingling of the wrist/arm.   Any signs of infection such as: redness, yellow drainage at the site, swelling or pain.      Dr Gentile's office will call you with next steps.      Call the office if you have any questions or concerns.  [phone]

## 2025-01-16 ENCOUNTER — TELEPHONE (OUTPATIENT)
Dept: CARDIOLOGY CLINIC | Age: 80
End: 2025-01-16

## 2025-01-16 NOTE — TELEPHONE ENCOUNTER
Patient would like to know if he should continue taking Lipitor and ASA since he does not have blockage?    Also would like to know when Dr Gentile would like to see him back. 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      Per Dr Gentile- yes he should continue the ASA and Lipitor and he would wait and see what the plan will be after seeing Dr Spurling to determine when he should see him back.

## 2025-01-16 NOTE — TELEPHONE ENCOUNTER
Pt called to speak w/nisha Carreno re: aspirin 81mg, atorvastatin 40mg.  Per Pt he had an procedure on 01/16 and per Pt he has no blockage. nisha Carreno please call the Pt to discuss his concerns.  Thank you

## 2025-02-03 ENCOUNTER — TELEPHONE (OUTPATIENT)
Dept: CARDIOLOGY CLINIC | Age: 80
End: 2025-02-03

## 2025-02-03 RX ORDER — METOPROLOL SUCCINATE 25 MG/1
25 TABLET, EXTENDED RELEASE ORAL DAILY
COMMUNITY

## 2025-02-03 RX ORDER — AMLODIPINE BESYLATE 5 MG/1
5 TABLET ORAL DAILY
COMMUNITY

## 2025-02-03 NOTE — TELEPHONE ENCOUNTER
Pt wanted Ev to know his appt with Surgeon Dr Michel was changed to 3/5/25. Any questions please call

## 2025-02-05 ENCOUNTER — TELEPHONE (OUTPATIENT)
Dept: CARDIOLOGY CLINIC | Age: 80
End: 2025-02-05

## 2025-02-05 DIAGNOSIS — E87.6 HYPOKALEMIA: ICD-10-CM

## 2025-02-05 DIAGNOSIS — I10 PRIMARY HYPERTENSION: Primary | ICD-10-CM

## 2025-02-05 NOTE — TELEPHONE ENCOUNTER
A few days after increase in Amlodipine from 2.5-5 mg a day he has noticed swelling in his feet and ankles. No shortness of breath or fluid retention any where else.      Since med increase BP's have been 138-145/60-80's      He would like to wait and have Dr Gentile address this issue on Friday when he is able.

## 2025-02-05 NOTE — TELEPHONE ENCOUNTER
States he has been having swelling in feet and ankles over the last 2-3 weeks. Pt asking if it could be from increase in amlodipine. Please call to discuss further.

## 2025-02-07 RX ORDER — SPIRONOLACTONE 25 MG/1
12.5 TABLET ORAL DAILY
Qty: 45 TABLET | Refills: 3 | Status: SHIPPED | OUTPATIENT
Start: 2025-02-07

## 2025-02-07 NOTE — TELEPHONE ENCOUNTER
Discussed with patient. Reviewed instructions. He verbalized understanding. OV scheduled with BOLIVAR 2/21. Lab order placed and Aldactone 12.5 mg daily sent to [facility].

## 2025-02-12 DIAGNOSIS — I10 PRIMARY HYPERTENSION: ICD-10-CM

## 2025-02-12 DIAGNOSIS — E87.6 HYPOKALEMIA: ICD-10-CM

## 2025-02-12 LAB
ANION GAP SERPL CALCULATED.3IONS-SCNC: 10 MMOL/L (ref 3–16)
BUN SERPL-MCNC: 16 MG/DL (ref 7–20)
CALCIUM SERPL-MCNC: 10.3 MG/DL (ref 8.3–10.6)
CHLORIDE SERPL-SCNC: 103 MMOL/L (ref 99–110)
CO2 SERPL-SCNC: 28 MMOL/L (ref 21–32)
CREAT SERPL-MCNC: 1.2 MG/DL (ref 0.8–1.3)
GFR SERPLBLD CREATININE-BSD FMLA CKD-EPI: 61 ML/MIN/{1.73_M2}
GLUCOSE SERPL-MCNC: 101 MG/DL (ref 70–99)
POTASSIUM SERPL-SCNC: 4.5 MMOL/L (ref 3.5–5.1)
SODIUM SERPL-SCNC: 141 MMOL/L (ref 136–145)

## 2025-02-13 ENCOUNTER — TELEPHONE (OUTPATIENT)
Dept: CARDIOLOGY CLINIC | Age: 80
End: 2025-02-13

## 2025-02-13 NOTE — TELEPHONE ENCOUNTER
----- Message from Dr. Bo Gentile MD sent at 2/13/2025  9:01 AM EST -----  Tell pt. their labs are good. F/u in clinic as planned.      BOLIVAR

## 2025-02-21 ENCOUNTER — OFFICE VISIT (OUTPATIENT)
Dept: CARDIOLOGY CLINIC | Age: 80
End: 2025-02-21

## 2025-02-21 VITALS
HEIGHT: 74 IN | HEART RATE: 49 BPM | SYSTOLIC BLOOD PRESSURE: 180 MMHG | DIASTOLIC BLOOD PRESSURE: 80 MMHG | BODY MASS INDEX: 21.94 KG/M2 | OXYGEN SATURATION: 98 % | WEIGHT: 171 LBS

## 2025-02-21 DIAGNOSIS — I10 PRIMARY HYPERTENSION: ICD-10-CM

## 2025-02-21 DIAGNOSIS — Q24.5 ANOMALOUS CORONARY ARTERY ORIGIN: ICD-10-CM

## 2025-02-21 DIAGNOSIS — R42 DIZZINESS: ICD-10-CM

## 2025-02-21 DIAGNOSIS — I49.1 PAC (PREMATURE ATRIAL CONTRACTION): ICD-10-CM

## 2025-02-21 DIAGNOSIS — R00.1 BRADYCARDIA: ICD-10-CM

## 2025-02-21 DIAGNOSIS — R60.0 LOCALIZED EDEMA: Primary | ICD-10-CM

## 2025-02-21 RX ORDER — HYDROCHLOROTHIAZIDE 25 MG/1
25 TABLET ORAL EVERY MORNING
Qty: 90 TABLET | Refills: 4 | Status: SHIPPED | OUTPATIENT
Start: 2025-02-21

## 2025-02-21 RX ORDER — NEBIVOLOL 10 MG/1
10 TABLET ORAL DAILY
Qty: 90 TABLET | Refills: 4 | Status: SHIPPED | OUTPATIENT
Start: 2025-02-21

## 2025-02-21 NOTE — PATIENT INSTRUCTIONS
Start Hydrochlorothiazide 25 mg daily  Stop Metoprolol  Start Bystolic 10 mg daily  After being on Bystolic for 1 week need to wear heart monitor for 3 days  Labs today  Follow up in 2 weeks

## 2025-02-21 NOTE — PROGRESS NOTES
[facility]  Cardiac Consult     Referring Provider:  Latosha Carcamo MD     Chief Complaint   Patient presents with    Follow-up    Hypertension        History of Present Illness:  79 y.o. male seen in follow-up for dizziness and PACs.    He was seen as a new pt one month ago.     He is fairly active at home and cares for his wife who has dementia.  Last month he was cleaning the house and developed lightheadedness and dizziness with presyncope.  He sat down and took his blood pressure and says his blood pressure was high but the monitor said that his heart rate was irregular.  He had a neighbor that saw a cardiologist, Dr. Mackenzie, and so he called his office to try to get an appointment.  He was told by the  to go the emergency room.\\    He went to the emergency room at [facility].  He was found to have hypokalemia with potassium 3.3.  He had acute on chronic renal insufficiency with creatinine of 1.69.  He was felt to have dehydration was given fluids.  He was also given some potassium.  His monitor showed frequent PACs according to the ER note.  He was discharged and followed up with his PCP who ordered a 2-day Holter.  He says he tried to get this done at [facility] but they no longer do these.  He is now seen here for evaluation.    He is feeling fairly well.  He has had no further syncope or presyncope.  He has no palpitations.  His monitor had stopped showing PACs but now again shows an irregular rhythm.  He had repeat labs that had normalized with his creatinine back to baseline about 1.3.  His potassium was 4.1.  He does have some lower chest discomfort that tends to come with activity.  He feels like this may be GI related however.    He returns today for stress echo.  It shows limited exercise tolerance.  EKG was negative for ischemia with occasional PAC and PVC.  Baseline echo showed borderline LV function with ejection fraction of about 50%.  With exercise there was dilation of

## 2025-02-22 LAB
ANION GAP SERPL CALCULATED.3IONS-SCNC: 14 MMOL/L (ref 3–16)
BUN SERPL-MCNC: 14 MG/DL (ref 7–20)
CALCIUM SERPL-MCNC: 10.4 MG/DL (ref 8.3–10.6)
CHLORIDE SERPL-SCNC: 104 MMOL/L (ref 99–110)
CO2 SERPL-SCNC: 25 MMOL/L (ref 21–32)
CREAT SERPL-MCNC: 1.2 MG/DL (ref 0.8–1.3)
GFR SERPLBLD CREATININE-BSD FMLA CKD-EPI: 61 ML/MIN/{1.73_M2}
GLUCOSE SERPL-MCNC: 106 MG/DL (ref 70–99)
POTASSIUM SERPL-SCNC: 4.7 MMOL/L (ref 3.5–5.1)
SODIUM SERPL-SCNC: 143 MMOL/L (ref 136–145)

## 2025-02-24 ENCOUNTER — TELEPHONE (OUTPATIENT)
Dept: CARDIOLOGY CLINIC | Age: 80
End: 2025-02-24

## 2025-02-24 NOTE — TELEPHONE ENCOUNTER
Tell him that bystolic is also a beta blocker and that Dr Gentile wants him to change to bystolic then wear a 48 hour monitor after he has been on bystolic for a week

## 2025-02-24 NOTE — TELEPHONE ENCOUNTER
Discussed with patient. He is aware of the reason for change.     He had such a bad experience after first dose of Bystolic he does not want to take it again. He is asking if he isn't taking it should he restart Toprol at a half dose or stay off of it and asking if he still would need monitor if not taking the Bystolic.

## 2025-02-24 NOTE — TELEPHONE ENCOUNTER
He took a 1/2 Toprol at 7 pm last evening because he was afraid to stop BB cold turkey.    Patient was just seen 2/21 in office. Toprol 25 mg was changed to Bystolic 10 mg to better control blood pressure with less bradycardia.  He was supposed to have heart monitor placed after having been on Bystolic a few days. Monitor scheduled 2/28.     He is asking what to do about meds and if he should still wear monitor.

## 2025-02-24 NOTE — TELEPHONE ENCOUNTER
Spoke with him and advised him to stay on toprol xl 12.5 mg daily. He has white coat hypertension so he did not tolerate switch to bystolic. BP at home now 130/70

## 2025-02-24 NOTE — TELEPHONE ENCOUNTER
Spoke with patient. He will monitor his BP and HR over the next few days and we will review on Friday.     Monitor cancelled for now until discussing changes and updated heart rates and bp's  with Dr Gentile on Friday.

## 2025-02-24 NOTE — TELEPHONE ENCOUNTER
Pt started taking nebivolol (BYSTOLIC) 10 MG tablet on Saturday at 11:00 am. He said that afternoon he had a bad reaction. He felt nauseated, confused, and dizzy. Pulse was 35. Stopped taking it. Yesterday his bp was in 170-180 with pulse of 35. Today he is feeling better. BP this morning is 140/78 pulse 65. Please call to discuss.

## 2025-02-25 ENCOUNTER — OFFICE VISIT (OUTPATIENT)
Dept: PRIMARY CARE CLINIC | Age: 80
End: 2025-02-25
Payer: MEDICARE

## 2025-02-25 VITALS
WEIGHT: 171.8 LBS | DIASTOLIC BLOOD PRESSURE: 82 MMHG | HEART RATE: 52 BPM | SYSTOLIC BLOOD PRESSURE: 170 MMHG | OXYGEN SATURATION: 97 % | HEIGHT: 74 IN | BODY MASS INDEX: 22.05 KG/M2

## 2025-02-25 DIAGNOSIS — Z00.00 INITIAL MEDICARE ANNUAL WELLNESS VISIT: Primary | ICD-10-CM

## 2025-02-25 PROCEDURE — 1123F ACP DISCUSS/DSCN MKR DOCD: CPT | Performed by: FAMILY MEDICINE

## 2025-02-25 PROCEDURE — 1159F MED LIST DOCD IN RCRD: CPT | Performed by: FAMILY MEDICINE

## 2025-02-25 PROCEDURE — 3077F SYST BP >= 140 MM HG: CPT | Performed by: FAMILY MEDICINE

## 2025-02-25 PROCEDURE — G0438 PPPS, INITIAL VISIT: HCPCS | Performed by: FAMILY MEDICINE

## 2025-02-25 PROCEDURE — 3079F DIAST BP 80-89 MM HG: CPT | Performed by: FAMILY MEDICINE

## 2025-02-25 SDOH — ECONOMIC STABILITY: FOOD INSECURITY: WITHIN THE PAST 12 MONTHS, THE FOOD YOU BOUGHT JUST DIDN'T LAST AND YOU DIDN'T HAVE MONEY TO GET MORE.: NEVER TRUE

## 2025-02-25 SDOH — ECONOMIC STABILITY: FOOD INSECURITY: WITHIN THE PAST 12 MONTHS, YOU WORRIED THAT YOUR FOOD WOULD RUN OUT BEFORE YOU GOT MONEY TO BUY MORE.: NEVER TRUE

## 2025-02-25 ASSESSMENT — LIFESTYLE VARIABLES
HOW MANY STANDARD DRINKS CONTAINING ALCOHOL DO YOU HAVE ON A TYPICAL DAY: 1 OR 2
HOW OFTEN DO YOU HAVE A DRINK CONTAINING ALCOHOL: 2-4 TIMES A MONTH

## 2025-02-25 ASSESSMENT — PATIENT HEALTH QUESTIONNAIRE - PHQ9
SUM OF ALL RESPONSES TO PHQ QUESTIONS 1-9: 0
SUM OF ALL RESPONSES TO PHQ QUESTIONS 1-9: 0
2. FEELING DOWN, DEPRESSED OR HOPELESS: NOT AT ALL
SUM OF ALL RESPONSES TO PHQ QUESTIONS 1-9: 0
1. LITTLE INTEREST OR PLEASURE IN DOING THINGS: NOT AT ALL
SUM OF ALL RESPONSES TO PHQ9 QUESTIONS 1 & 2: 0
SUM OF ALL RESPONSES TO PHQ QUESTIONS 1-9: 0

## 2025-02-25 NOTE — PROGRESS NOTES
Medicare Annual Wellness Visit    Martin Ann is here for Medicare AWV    Assessment & Plan   Initial Medicare annual wellness visit     Return in 1 year (on 2/25/2026) for Medicare AWV.     Subjective       Patient's complete Health Risk Assessment and screening values have been reviewed and are found in Flowsheets. The following problems were reviewed today and where indicated follow up appointments were made and/or referrals ordered.    Positive Risk Factor Screenings with Interventions:             General HRA Questions:  Select all that apply: (!) Stress  Interventions - Stress:  See AVS for additional education material                    The following care gaps were also addressed today:   Lipids (Yearly)  Patient aware    Shingles vaccine (2 of 3)     Respiratory Syncytial Virus (RSV) Pregnant or age 60 yrs+ (1 - 1-dose 75+ series)     DTaP/Tdap/Td vaccine (2 - Td or Tdap)     Flu vaccine (1)     COVID-19 Vaccine (1 - 2024-25 season)        Patient declined all health maintenance vaccinations        Objective   Vitals:    02/25/25 1056   BP: (!) 170/82   Pulse: 52   SpO2: 97%   Weight: 77.9 kg (171 lb 12.8 oz)   Height: 1.88 m (6' 2\")      Body mass index is 22.06 kg/m².                    Allergies   Allergen Reactions    Terbinafine Hives     Prior to Visit Medications    Medication Sig Taking? Authorizing Provider   nebivolol (BYSTOLIC) 10 MG tablet Take 1 tablet by mouth daily  Bo Gentile MD   hydroCHLOROthiazide (HYDRODIURIL) 25 MG tablet Take 1 tablet by mouth every morning  Bo Gentile MD   spironolactone (ALDACTONE) 25 MG tablet Take 0.5 tablets by mouth daily  Bo Gentile MD   amLODIPine (NORVASC) 5 MG tablet Take 1 tablet by mouth daily  Provider, MD Chon   aspirin 81 MG EC tablet Take 1 tablet by mouth daily  Bo Gentile MD   atorvastatin (LIPITOR) 40 MG tablet Take 1 tablet by mouth daily  Bo Gentile MD   fluocinonide (LIDEX) 0.05 % cream Apply topically 2

## 2025-02-28 NOTE — TELEPHONE ENCOUNTER
Patient has been doing well on 1/2 Toprol and HR has been in the 70's and BP's have been better. Mostly 120's and 130's.    MMK wants monitor next week and appt following week. Re schedule appt. Needs monitor scheduled. Registrar Diana to call pt to schedule monitor at [place].

## 2025-03-05 ENCOUNTER — OFFICE VISIT (OUTPATIENT)
Age: 80
End: 2025-03-05
Payer: MEDICARE

## 2025-03-05 ENCOUNTER — TELEPHONE (OUTPATIENT)
Age: 80
End: 2025-03-05

## 2025-03-05 VITALS
BODY MASS INDEX: 20.53 KG/M2 | OXYGEN SATURATION: 99 % | SYSTOLIC BLOOD PRESSURE: 170 MMHG | HEART RATE: 95 BPM | DIASTOLIC BLOOD PRESSURE: 90 MMHG | HEIGHT: 74 IN | TEMPERATURE: 97.2 F | WEIGHT: 160 LBS

## 2025-03-05 DIAGNOSIS — Q24.5 ANOMALOUS CORONARY ARTERY ORIGIN: Primary | ICD-10-CM

## 2025-03-05 PROCEDURE — 1123F ACP DISCUSS/DSCN MKR DOCD: CPT | Performed by: THORACIC SURGERY (CARDIOTHORACIC VASCULAR SURGERY)

## 2025-03-05 PROCEDURE — 1159F MED LIST DOCD IN RCRD: CPT | Performed by: THORACIC SURGERY (CARDIOTHORACIC VASCULAR SURGERY)

## 2025-03-05 PROCEDURE — 3077F SYST BP >= 140 MM HG: CPT | Performed by: THORACIC SURGERY (CARDIOTHORACIC VASCULAR SURGERY)

## 2025-03-05 PROCEDURE — 1036F TOBACCO NON-USER: CPT | Performed by: THORACIC SURGERY (CARDIOTHORACIC VASCULAR SURGERY)

## 2025-03-05 PROCEDURE — G8420 CALC BMI NORM PARAMETERS: HCPCS | Performed by: THORACIC SURGERY (CARDIOTHORACIC VASCULAR SURGERY)

## 2025-03-05 PROCEDURE — 3080F DIAST BP >= 90 MM HG: CPT | Performed by: THORACIC SURGERY (CARDIOTHORACIC VASCULAR SURGERY)

## 2025-03-05 PROCEDURE — 99205 OFFICE O/P NEW HI 60 MIN: CPT | Performed by: THORACIC SURGERY (CARDIOTHORACIC VASCULAR SURGERY)

## 2025-03-05 PROCEDURE — G8427 DOCREV CUR MEDS BY ELIG CLIN: HCPCS | Performed by: THORACIC SURGERY (CARDIOTHORACIC VASCULAR SURGERY)

## 2025-03-05 RX ORDER — METOPROLOL SUCCINATE 25 MG/1
25 TABLET, EXTENDED RELEASE ORAL
COMMUNITY

## 2025-03-05 ASSESSMENT — ENCOUNTER SYMPTOMS
ALLERGIC/IMMUNOLOGIC NEGATIVE: 1
EYES NEGATIVE: 1
GASTROINTESTINAL NEGATIVE: 1

## 2025-03-05 NOTE — H&P
Allergies   Terbinafine    Social History     Social History       Tobacco History       Smoking Status  Former Current Packs/Day  1 pack/day Average Packs/Day  1 pack/day for 5.0 years (5.0 ttl pk-yrs) Smoking Tobacco Type  Cigarettes   Pack Year History     Packs/Day From To Years    1   5.0      Passive Exposure  Never      Smokeless Tobacco Use  Never              Alcohol History       Alcohol Use Status  Yes Drinks/Week  0 Standard drinks or equivalent per week Comment  occasionally              Drug Use       Drug Use Status  No              Sexual Activity       Sexually Active  Not Currently Partners  Female                    Family History     Family History   Problem Relation Age of Onset    Coronary Art Dis Mother         passed at 95    Cancer Father         55    Cerebral Aneurysm Brother         58    No Known Problems Brother     Cancer Brother         Lymphoma/bone marrow - in remission       Review of Systems   Review of Systems   Constitutional: Negative.    HENT: Negative.     Eyes: Negative.    Cardiovascular:  Positive for chest pain and palpitations.   Gastrointestinal: Negative.    Endocrine: Negative.    Genitourinary: Negative.    Musculoskeletal: Negative.    Skin: Negative.    Allergic/Immunologic: Negative.    Neurological:  Positive for light-headedness.   Hematological: Negative.    Psychiatric/Behavioral: Negative.         Physical Exam   BP (!) 170/90 (Site: Right Upper Arm, Position: Sitting, Cuff Size: Medium Adult)   Pulse 95   Temp 97.2 °F (36.2 °C) (Temporal)   Ht 1.88 m (6' 2\")   Wt 72.6 kg (160 lb)   SpO2 99%   BMI 20.54 kg/m²     Physical Exam  Constitutional:       Appearance: Normal appearance. He is normal weight.   HENT:      Head: Normocephalic and atraumatic.      Nose: Nose normal.   Eyes:      Extraocular Movements: Extraocular movements intact.      Conjunctiva/sclera: Conjunctivae normal.   Cardiovascular:      Rate and Rhythm: Normal rate and regular

## 2025-03-05 NOTE — TELEPHONE ENCOUNTER
At the direction of Dr. Michel, the office of Dr. Clark Villaseñor at [facility] was contacted ([phone]). Spoke with Lyssa who verified mailing address as :    Mail code [address] 4-[address]  Dr. Clark Villaseñor  [address] [place]  [address]    [facility] [phone].     The demographics, drivers license, insurance card, OV note was faxed to Lyssa at [phone].This is to obtain his opinion following a conversation between Dr. Michel and Dr. Villaseñor.     A copy of the OV note was mailed to pt home as requested.

## 2025-03-06 ENCOUNTER — TELEPHONE (OUTPATIENT)
Dept: CARDIOLOGY CLINIC | Age: 80
End: 2025-03-06

## 2025-03-06 DIAGNOSIS — R00.1 BRADYCARDIA: Primary | ICD-10-CM

## 2025-03-06 DIAGNOSIS — I49.1 PAC (PREMATURE ATRIAL CONTRACTION): ICD-10-CM

## 2025-03-06 DIAGNOSIS — R42 DIZZINESS: ICD-10-CM

## 2025-03-06 NOTE — TELEPHONE ENCOUNTER
Pt called in letting MMK know that when going to see Dr. Michel he had ordered a 30 day monitor, pt was wondering if he could use the monitor he has on now to coordinate with the 30 day monitor   Please advise  Thank you

## 2025-03-06 NOTE — TELEPHONE ENCOUNTER
Per [employer] Rep Carri Bañuelos - They can extend monitor for 30 days. All patient info sent to [employer] email with a copy to her per her request. Patient aware and will  more patches at [place] tomorrow morning.

## 2025-03-06 NOTE — TELEPHONE ENCOUNTER
Talked to patient. Will contact [employer] rep to find about how to switch 3 day over to an MCOT.

## 2025-03-10 ENCOUNTER — TELEPHONE (OUTPATIENT)
Dept: CARDIOLOGY CLINIC | Age: 80
End: 2025-03-10

## 2025-03-10 NOTE — TELEPHONE ENCOUNTER
Patient's heart monitor has been extended to 30 days. Patient asking if he still needs appt 3/13 or if it can be pushed back until after 30 day monitor complete.   Per MMK- can cancel 3/13 and reschedule on either 4/9 or 4/11 at [place]. Left message for patient to call back.

## 2025-03-21 ENCOUNTER — TELEPHONE (OUTPATIENT)
Dept: CARDIOLOGY CLINIC | Age: 80
End: 2025-03-21

## 2025-03-21 NOTE — TELEPHONE ENCOUNTER
Pt is asking if it would be a good idea to get a renal panel done due to all the meds he takes and his kidney. Please advise pt.

## 2025-03-21 NOTE — TELEPHONE ENCOUNTER
Last renal panel good on 2/21/25.  Taking Hctz, [place], and Lisinopril  He has cysts on his kidney     Has OV 4/11. Advised pt that I would get back to him next week after reviewing with Dr Gentile.

## 2025-03-24 ENCOUNTER — OFFICE VISIT (OUTPATIENT)
Dept: PRIMARY CARE CLINIC | Age: 80
End: 2025-03-24
Payer: MEDICARE

## 2025-03-24 VITALS
DIASTOLIC BLOOD PRESSURE: 80 MMHG | BODY MASS INDEX: 20.29 KG/M2 | WEIGHT: 158 LBS | HEART RATE: 79 BPM | SYSTOLIC BLOOD PRESSURE: 130 MMHG | OXYGEN SATURATION: 99 %

## 2025-03-24 DIAGNOSIS — R42 DIZZINESS: Primary | ICD-10-CM

## 2025-03-24 DIAGNOSIS — Z63.6 CAREGIVER STRESS: ICD-10-CM

## 2025-03-24 PROCEDURE — G8427 DOCREV CUR MEDS BY ELIG CLIN: HCPCS | Performed by: FAMILY MEDICINE

## 2025-03-24 PROCEDURE — 3075F SYST BP GE 130 - 139MM HG: CPT | Performed by: FAMILY MEDICINE

## 2025-03-24 PROCEDURE — 1160F RVW MEDS BY RX/DR IN RCRD: CPT | Performed by: FAMILY MEDICINE

## 2025-03-24 PROCEDURE — 1036F TOBACCO NON-USER: CPT | Performed by: FAMILY MEDICINE

## 2025-03-24 PROCEDURE — 36415 COLL VENOUS BLD VENIPUNCTURE: CPT | Performed by: FAMILY MEDICINE

## 2025-03-24 PROCEDURE — 3079F DIAST BP 80-89 MM HG: CPT | Performed by: FAMILY MEDICINE

## 2025-03-24 PROCEDURE — G8420 CALC BMI NORM PARAMETERS: HCPCS | Performed by: FAMILY MEDICINE

## 2025-03-24 PROCEDURE — 99214 OFFICE O/P EST MOD 30 MIN: CPT | Performed by: FAMILY MEDICINE

## 2025-03-24 PROCEDURE — 1123F ACP DISCUSS/DSCN MKR DOCD: CPT | Performed by: FAMILY MEDICINE

## 2025-03-24 PROCEDURE — 1159F MED LIST DOCD IN RCRD: CPT | Performed by: FAMILY MEDICINE

## 2025-03-24 SDOH — SOCIAL STABILITY - SOCIAL INSECURITY: DEPENDENT RELATIVE NEEDING CARE AT HOME: Z63.6

## 2025-03-24 ASSESSMENT — ENCOUNTER SYMPTOMS
SHORTNESS OF BREATH: 0
NAUSEA: 1
SORE THROAT: 0
EYE PAIN: 0
ABDOMINAL PAIN: 0
COUGH: 0

## 2025-03-24 NOTE — TELEPHONE ENCOUNTER
Called patient. He went to his PCP today as he was not feeling great. Runny nose, back pain, and nausea. PCP chris labs with a renal included.

## 2025-03-24 NOTE — PROGRESS NOTES
Martin Ann (:  1945) is a 79 y.o. male,Established patient, here for evaluation of the following chief complaint(s):  Nausea and Dizziness      SUBJECTIVE/OBJECTIVE:  HPI    Patient is here for nausea and dizziness.    He has had nausea, dizziness, a runny nose, and some back pain over the weekend -for about 3 days. He isn't eating a lot.    He mostly worries a lot about this because he has only one kidney and has cysts on that kidney. Cysts have been stable for years and he has had several blood tests this year showing stable kidney function. However he was reading online about symptoms of acute kidney injury and he worries that he has the symptoms.      Patient admits to a lot of stress being a caregiver to his wife. He has to be with her at all times and doesn't have anyone to help out. With his recent health problems, he worries a lot more.    He was recently switched to a new medication by his cardiologist, switched from metoprolol to Bystolic. His heart rate decreased to 35 at home and he felt very poorly. He thought he might need to go to the ER, but didn't because he knew he didn't have anyone to care for his wife if he went by ambulance.    Patient has tried asking hsi daughter for help, but she said she is not able to help. He isn't sure if there are other resources or some sort of  he could take her to during the day. He would be very interested in someone calling him to find out what resources are available to help.        Review of Systems   Constitutional:  Negative for fatigue and fever.   HENT:  Positive for congestion. Negative for sore throat.    Eyes:  Negative for pain and visual disturbance.   Respiratory:  Negative for cough and shortness of breath.    Cardiovascular:  Negative for chest pain.   Gastrointestinal:  Positive for nausea. Negative for abdominal pain.   Genitourinary:  Negative for dysuria.   Musculoskeletal:  Negative for arthralgias.   Skin:  Negative for

## 2025-03-25 ENCOUNTER — RESULTS FOLLOW-UP (OUTPATIENT)
Dept: PRIMARY CARE CLINIC | Age: 80
End: 2025-03-25

## 2025-03-25 LAB
ANION GAP SERPL CALCULATED.3IONS-SCNC: 14 MMOL/L (ref 3–16)
BASOPHILS # BLD: 0 K/UL (ref 0–0.2)
BASOPHILS NFR BLD: 0.5 %
BUN SERPL-MCNC: 21 MG/DL (ref 7–20)
CALCIUM SERPL-MCNC: 10.2 MG/DL (ref 8.3–10.6)
CHLORIDE SERPL-SCNC: 100 MMOL/L (ref 99–110)
CO2 SERPL-SCNC: 23 MMOL/L (ref 21–32)
CREAT SERPL-MCNC: 1.3 MG/DL (ref 0.8–1.3)
DEPRECATED RDW RBC AUTO: 13.3 % (ref 12.4–15.4)
EOSINOPHIL # BLD: 0.1 K/UL (ref 0–0.6)
EOSINOPHIL NFR BLD: 1.1 %
GFR SERPLBLD CREATININE-BSD FMLA CKD-EPI: 55 ML/MIN/{1.73_M2}
GLUCOSE SERPL-MCNC: 112 MG/DL (ref 70–99)
HCT VFR BLD AUTO: 43.7 % (ref 40.5–52.5)
HGB BLD-MCNC: 14.8 G/DL (ref 13.5–17.5)
LYMPHOCYTES # BLD: 0.8 K/UL (ref 1–5.1)
LYMPHOCYTES NFR BLD: 11.6 %
MCH RBC QN AUTO: 31 PG (ref 26–34)
MCHC RBC AUTO-ENTMCNC: 33.9 G/DL (ref 31–36)
MCV RBC AUTO: 91.4 FL (ref 80–100)
MONOCYTES # BLD: 0.5 K/UL (ref 0–1.3)
MONOCYTES NFR BLD: 7.8 %
NEUTROPHILS # BLD: 5.2 K/UL (ref 1.7–7.7)
NEUTROPHILS NFR BLD: 79 %
PLATELET # BLD AUTO: 173 K/UL (ref 135–450)
PMV BLD AUTO: 9.4 FL (ref 5–10.5)
POTASSIUM SERPL-SCNC: 4 MMOL/L (ref 3.5–5.1)
RBC # BLD AUTO: 4.78 M/UL (ref 4.2–5.9)
SODIUM SERPL-SCNC: 137 MMOL/L (ref 136–145)
WBC # BLD AUTO: 6.6 K/UL (ref 4–11)

## 2025-03-27 ENCOUNTER — TELEPHONE (OUTPATIENT)
Dept: CARDIOLOGY CLINIC | Age: 80
End: 2025-03-27

## 2025-03-27 ENCOUNTER — TELEPHONE (OUTPATIENT)
Dept: OTHER | Age: 80
End: 2025-03-27

## 2025-03-27 DIAGNOSIS — Z85.528 H/O RENAL CELL CANCER: Primary | ICD-10-CM

## 2025-03-27 DIAGNOSIS — Z90.5 H/O LEFT NEPHRECTOMY: ICD-10-CM

## 2025-03-27 RX ORDER — HYDROCHLOROTHIAZIDE 25 MG/1
12.5 TABLET ORAL EVERY MORNING
Qty: 90 TABLET | Refills: 0
Start: 2025-03-27

## 2025-03-27 NOTE — TELEPHONE ENCOUNTER
[facility]--CHW    CHW called patient to introduce the partnership program. Cell line is disconnected. Home number is working and message was left to return the phone call. Patient will be deferred for 7 days.

## 2025-03-27 NOTE — TELEPHONE ENCOUNTER
Patient is having symptoms of runny nose, back pain off and on, nausea, no appetite, fatigue, insomnia. He has been feeling like this     He is concerned about his kidney as he only has one and it has a couple cysts on it. He is concerned about the drugs he is on. Labs from Monday show Cr 1.3 but bun went from 16-21. His PCP did not feel like it was a big deal but patient is very concerned. He would like to get off of some of the drugs he is on. He would like to get off of Lipitor and Norvasc. He is concerned about being on Spironolactone and HCTZ.     He is concerned about rhabdo. He has not worked out since last week because he feels so bad.    He is wondering if his anxiety over being on these drugs is worth being on them to lower BP.

## 2025-03-27 NOTE — TELEPHONE ENCOUNTER
Pt calling in requesting to speak to SRIRAM awshburn regarding medication pt he thinks he may be going into kidney failure   Please call and advise   Thank you

## 2025-03-27 NOTE — TELEPHONE ENCOUNTER
Discussed with MMK- He has mild coronary disease so MMK would rather him be on it but if it is going to cause him a lot of stress and anxiety and he wants to stop that is fine.     He sounds very concerned about his kidney. Can refer to nephrologist. Labs look okay. Maybe very slightly dry. Can decrease HCTZ in half and recheck labs in 4 weeks.

## 2025-03-27 NOTE — TELEPHONE ENCOUNTER
Patient will trial off of Lipitor and take 1/2 of HCTZ. Lab orders placed for recheck in 4 weeks. Pt has appt 4/11. Referral sent to Dr Rodgers. Patient given info for [facility].

## 2025-03-28 ENCOUNTER — TELEPHONE (OUTPATIENT)
Dept: OTHER | Age: 80
End: 2025-03-28

## 2025-03-28 NOTE — TELEPHONE ENCOUNTER
Pt stopped taking the statin yesterday and he is experiencing lightheadedness, dizziness and is nauseous. He said maybe he should have weaned off it. Please call pt. [phone]

## 2025-03-28 NOTE — TELEPHONE ENCOUNTER
[facility]--CHW    CHW returned call from patient. He did not answer. CHW left him a voicemail to return my call, leaving my work hours as well on the recording.

## 2025-03-28 NOTE — TELEPHONE ENCOUNTER
Patient reports symptoms of lightheadedness, dizziness and nausea came on slow while he was at [employer] this morning. When he got home from [employer] he checked his BP and spO2 which were WNL. He took some pepto bismol which improved his symptoms. He has since drank 2 bottles of water. He plans to try to eat soon since he has not had much today. He was worried that stopping the atorvastatin abruptly could have caused these symptoms, ensured patient that there is no need to wean statin and not likely the cause of his symptoms. Believes he may have picked up a GI bug of some sort.     Patient is currently wearing cardiac monitor denies palpitations/fast heart rate or cardiac symptoms with lightheadedness/dizziness/nausea. Verified vital connect monitor, no urgent reports/events listed, no patient symptoms listed, currently SR 70's. Patient states he has not been logging nausea or lightheadedness because it's been so frequent. Advised patient to document symptoms such as lightheadedness/dizziness etc. So it can be correlated with his rhythm.     He has decreased hctz 1/2 as instructed and is working to stay hydrated. Discussed symptom/vital monitoring, slow position changes and encouraged call if symptoms return or worsen.

## 2025-03-31 ENCOUNTER — TELEPHONE (OUTPATIENT)
Dept: OTHER | Age: 80
End: 2025-03-31

## 2025-03-31 NOTE — TELEPHONE ENCOUNTER
[facility]--CHW    Patient called CHW, but CHW was unable to take his call at the time. When CHW returned patient's call, voicemail came on. CHW left a message, advising patient that I would call back tomorrow at 815 am.

## 2025-04-01 ENCOUNTER — COMMUNITY OUTREACH (OUTPATIENT)
Dept: OTHER | Age: 80
End: 2025-04-01

## 2025-04-01 NOTE — PROGRESS NOTES
[facility]--CHW    CHW called patient at 815 am to discuss his social barriers and needs. Patient stated he was referred by his PCP. CHW introduced herself and asked patient about his barriers, while also discussing the partnership program. Patient stated his wife was diagnosed with dementia/Alzheimers in 2022. He needs support services because he is her primary caregiver. CHW gave patient the number to [place] [facility] so that he could start receiving support services in caring for his wife. Patient stated he would think it over and call at a later date, after he completes some other tasks. CHW advised patient to call her if he had any further questions or concerns.    This referral is now closed.

## 2025-04-11 ENCOUNTER — OFFICE VISIT (OUTPATIENT)
Dept: CARDIOLOGY CLINIC | Age: 80
End: 2025-04-11
Payer: MEDICARE

## 2025-04-11 VITALS
OXYGEN SATURATION: 99 % | WEIGHT: 165 LBS | SYSTOLIC BLOOD PRESSURE: 126 MMHG | HEART RATE: 89 BPM | BODY MASS INDEX: 21.17 KG/M2 | DIASTOLIC BLOOD PRESSURE: 64 MMHG | HEIGHT: 74 IN

## 2025-04-11 DIAGNOSIS — I49.1 PAC (PREMATURE ATRIAL CONTRACTION): ICD-10-CM

## 2025-04-11 DIAGNOSIS — Q24.5 ANOMALOUS CORONARY ARTERY ORIGIN: ICD-10-CM

## 2025-04-11 DIAGNOSIS — R42 DIZZY: ICD-10-CM

## 2025-04-11 DIAGNOSIS — I10 PRIMARY HYPERTENSION: Primary | ICD-10-CM

## 2025-04-11 PROCEDURE — 1123F ACP DISCUSS/DSCN MKR DOCD: CPT | Performed by: INTERNAL MEDICINE

## 2025-04-11 PROCEDURE — 1159F MED LIST DOCD IN RCRD: CPT | Performed by: INTERNAL MEDICINE

## 2025-04-11 PROCEDURE — 3078F DIAST BP <80 MM HG: CPT | Performed by: INTERNAL MEDICINE

## 2025-04-11 PROCEDURE — 3074F SYST BP LT 130 MM HG: CPT | Performed by: INTERNAL MEDICINE

## 2025-04-11 PROCEDURE — G8427 DOCREV CUR MEDS BY ELIG CLIN: HCPCS | Performed by: INTERNAL MEDICINE

## 2025-04-11 PROCEDURE — 99214 OFFICE O/P EST MOD 30 MIN: CPT | Performed by: INTERNAL MEDICINE

## 2025-04-11 PROCEDURE — 1036F TOBACCO NON-USER: CPT | Performed by: INTERNAL MEDICINE

## 2025-04-11 PROCEDURE — G8420 CALC BMI NORM PARAMETERS: HCPCS | Performed by: INTERNAL MEDICINE

## 2025-04-11 NOTE — PROGRESS NOTES
not had an issue thus far.  Certainly his hypertension currently could be exacerbating the issue.  He also is very reluctant to undergo surgery due to his age and due to the fact that he is the primary caregiver for his wife who has severe dementia.  The options obviously are to undergo surgery with reimplantation of the LAD versus medical therapy.  For medical therapy we do need much better blood pressure control.  I would like him better beta blocked however we are struggling with baseline bradycardia.  Will change hypertensive medicines as described above and titrate as he tolerates.    He does not want to consider surgery.  It appears that CT surgery felt the same way but I cannot tell a definitive conclusion.  I agree with his age that the risk of surgery probably outweighs the benefits.  Will continue medical therapy.      PLAN:  Continue current medical therapy  Try to restart exercise  I recommended cardiac rehab but he says he cannot go due to having to care for his wife  Follow-up 30-day monitor  Follow-up in 3 months.      Thank you for allowing me to participate in the care of this individual.      Bo Gentile M.D., [facility]

## 2025-04-15 ENCOUNTER — RESULTS FOLLOW-UP (OUTPATIENT)
Dept: CARDIOLOGY | Age: 80
End: 2025-04-15

## 2025-04-16 NOTE — TELEPHONE ENCOUNTER
----- Message from Dr. Bo Gentile MD sent at 4/15/2025  1:44 PM EDT -----  Monitor looks good. No afib.    MMK

## 2025-08-08 ENCOUNTER — OFFICE VISIT (OUTPATIENT)
Dept: CARDIOLOGY CLINIC | Age: 80
End: 2025-08-08
Payer: MEDICARE

## 2025-08-08 VITALS
DIASTOLIC BLOOD PRESSURE: 78 MMHG | BODY MASS INDEX: 22.59 KG/M2 | WEIGHT: 176 LBS | HEIGHT: 74 IN | OXYGEN SATURATION: 97 % | HEART RATE: 90 BPM | SYSTOLIC BLOOD PRESSURE: 122 MMHG

## 2025-08-08 DIAGNOSIS — I49.1 PAC (PREMATURE ATRIAL CONTRACTION): ICD-10-CM

## 2025-08-08 DIAGNOSIS — Q24.5 ANOMALOUS CORONARY ARTERY ORIGIN: Primary | ICD-10-CM

## 2025-08-08 DIAGNOSIS — I10 PRIMARY HYPERTENSION: ICD-10-CM

## 2025-08-08 PROCEDURE — 1123F ACP DISCUSS/DSCN MKR DOCD: CPT | Performed by: INTERNAL MEDICINE

## 2025-08-08 PROCEDURE — 1159F MED LIST DOCD IN RCRD: CPT | Performed by: INTERNAL MEDICINE

## 2025-08-08 PROCEDURE — 3074F SYST BP LT 130 MM HG: CPT | Performed by: INTERNAL MEDICINE

## 2025-08-08 PROCEDURE — 3078F DIAST BP <80 MM HG: CPT | Performed by: INTERNAL MEDICINE

## 2025-08-08 PROCEDURE — 1036F TOBACCO NON-USER: CPT | Performed by: INTERNAL MEDICINE

## 2025-08-08 PROCEDURE — G8427 DOCREV CUR MEDS BY ELIG CLIN: HCPCS | Performed by: INTERNAL MEDICINE

## 2025-08-08 PROCEDURE — 99214 OFFICE O/P EST MOD 30 MIN: CPT | Performed by: INTERNAL MEDICINE

## 2025-08-08 PROCEDURE — G8420 CALC BMI NORM PARAMETERS: HCPCS | Performed by: INTERNAL MEDICINE

## 2025-08-08 RX ORDER — ATORVASTATIN CALCIUM 10 MG/1
10 TABLET, FILM COATED ORAL DAILY
Qty: 90 TABLET | Refills: 4 | Status: SHIPPED | OUTPATIENT
Start: 2025-08-08

## 2025-08-09 LAB
ANION GAP SERPL CALCULATED.3IONS-SCNC: 12 MMOL/L (ref 3–16)
BUN SERPL-MCNC: 18 MG/DL (ref 7–20)
CALCIUM SERPL-MCNC: 9.9 MG/DL (ref 8.3–10.6)
CHLORIDE SERPL-SCNC: 102 MMOL/L (ref 99–110)
CO2 SERPL-SCNC: 27 MMOL/L (ref 21–32)
CREAT SERPL-MCNC: 1.3 MG/DL (ref 0.8–1.3)
GFR SERPLBLD CREATININE-BSD FMLA CKD-EPI: 55 ML/MIN/{1.73_M2}
GLUCOSE SERPL-MCNC: 96 MG/DL (ref 70–99)
POTASSIUM SERPL-SCNC: 3.8 MMOL/L (ref 3.5–5.1)
SODIUM SERPL-SCNC: 141 MMOL/L (ref 136–145)

## (undated) DEVICE — TR BAND RADIAL ARTERY COMPRESSION DEVICE: Brand: TR BAND

## (undated) DEVICE — DRAPE,ANGIO,BRACH,STERILE,38X44: Brand: MEDLINE

## (undated) DEVICE — CATHETER ANGIO JL4 0.045 INX5 FRX100 CM THRULUMEN EXPO

## (undated) DEVICE — GLIDESHEATH SLENDER ACCESS KIT: Brand: GLIDESHEATH SLENDER

## (undated) DEVICE — CATHETER DIAG AD 5FR L110CM 145DEG COR GRY HYDRPHLC NYL

## (undated) DEVICE — CATHETER DIAG L100CM DIA5.2FR 0.038IN POLYUR COR JR4 STD

## (undated) DEVICE — CATHETER ANGIO 5FR L100CM GRY S STL NYL JL3.5 3 SEG BRAID L

## (undated) DEVICE — CATH LAB PACK: Brand: MEDLINE INDUSTRIES, INC.

## (undated) DEVICE — CATHETER ANGIO 5FR L100CM GRY S STL NYL JL 4.5 3 SEG BRAID L

## (undated) DEVICE — Device: Brand: NOMOLINE™ LH ADULT NASAL CO2 CANNULA WITH O2 4M

## (undated) DEVICE — CATHETER ANGIO 5FR L100CM ID0.045IN AL2 CRV ROBUST SHFT

## (undated) DEVICE — GUIDEWIRE VASC L260CM DIA0.035IN RAD 3MM J TIP L7CM PTFE

## (undated) DEVICE — PAD, DEFIB, ADULT, RADIOTRANS, PHYSIO: Brand: MEDLINE

## (undated) DEVICE — KIT AT-X65 ANGIOTOUCH HAND CONTROLLER